# Patient Record
Sex: FEMALE | Race: BLACK OR AFRICAN AMERICAN | Employment: OTHER | URBAN - METROPOLITAN AREA
[De-identification: names, ages, dates, MRNs, and addresses within clinical notes are randomized per-mention and may not be internally consistent; named-entity substitution may affect disease eponyms.]

---

## 2024-07-16 ENCOUNTER — OFFICE VISIT (OUTPATIENT)
Dept: FAMILY MEDICINE CLINIC | Facility: MEDICAL CENTER | Age: 64
End: 2024-07-16
Payer: COMMERCIAL

## 2024-07-16 VITALS
TEMPERATURE: 97.4 F | HEART RATE: 70 BPM | SYSTOLIC BLOOD PRESSURE: 110 MMHG | HEIGHT: 64 IN | OXYGEN SATURATION: 99 % | DIASTOLIC BLOOD PRESSURE: 70 MMHG | RESPIRATION RATE: 18 BRPM | BODY MASS INDEX: 22.53 KG/M2 | WEIGHT: 132 LBS

## 2024-07-16 DIAGNOSIS — I10 ESSENTIAL HYPERTENSION: ICD-10-CM

## 2024-07-16 DIAGNOSIS — Z11.59 NEED FOR HEPATITIS C SCREENING TEST: ICD-10-CM

## 2024-07-16 DIAGNOSIS — E11.9 TYPE 2 DIABETES MELLITUS WITHOUT COMPLICATION, WITHOUT LONG-TERM CURRENT USE OF INSULIN (HCC): ICD-10-CM

## 2024-07-16 DIAGNOSIS — Z11.4 SCREENING FOR HIV (HUMAN IMMUNODEFICIENCY VIRUS): ICD-10-CM

## 2024-07-16 DIAGNOSIS — G89.29 CHRONIC BILATERAL LOW BACK PAIN WITH RIGHT-SIDED SCIATICA: ICD-10-CM

## 2024-07-16 DIAGNOSIS — I48.0 PAROXYSMAL ATRIAL FIBRILLATION (HCC): ICD-10-CM

## 2024-07-16 DIAGNOSIS — E03.9 ACQUIRED HYPOTHYROIDISM: ICD-10-CM

## 2024-07-16 DIAGNOSIS — G56.01 CARPAL TUNNEL SYNDROME OF RIGHT WRIST: ICD-10-CM

## 2024-07-16 DIAGNOSIS — E78.2 MIXED HYPERLIPIDEMIA: ICD-10-CM

## 2024-07-16 DIAGNOSIS — Z12.31 ENCOUNTER FOR SCREENING MAMMOGRAM FOR BREAST CANCER: ICD-10-CM

## 2024-07-16 DIAGNOSIS — M05.9 RHEUMATOID ARTHRITIS WITH POSITIVE RHEUMATOID FACTOR, INVOLVING UNSPECIFIED SITE (HCC): ICD-10-CM

## 2024-07-16 DIAGNOSIS — M54.41 CHRONIC BILATERAL LOW BACK PAIN WITH RIGHT-SIDED SCIATICA: ICD-10-CM

## 2024-07-16 DIAGNOSIS — R19.4 CHANGE IN BOWEL HABITS: Primary | ICD-10-CM

## 2024-07-16 PROCEDURE — 99204 OFFICE O/P NEW MOD 45 MIN: CPT | Performed by: INTERNAL MEDICINE

## 2024-07-16 RX ORDER — FOLIC ACID 1 MG/1
1 TABLET ORAL DAILY
COMMUNITY
Start: 2024-06-18

## 2024-07-16 RX ORDER — METHOTREXATE 2.5 MG/1
20 TABLET ORAL WEEKLY
Qty: 32 TABLET | Refills: 5 | Status: SHIPPED | OUTPATIENT
Start: 2024-07-16

## 2024-07-16 RX ORDER — LEVOTHYROXINE SODIUM 88 UG/1
88 TABLET ORAL DAILY
COMMUNITY

## 2024-07-16 RX ORDER — LISINOPRIL 20 MG/1
20 TABLET ORAL DAILY
COMMUNITY

## 2024-07-16 RX ORDER — ASPIRIN 81 MG/1
81 TABLET, CHEWABLE ORAL DAILY
COMMUNITY
Start: 2024-06-18

## 2024-07-16 RX ORDER — METHOTREXATE 2.5 MG/1
2.5 TABLET ORAL WEEKLY
COMMUNITY
End: 2024-07-16 | Stop reason: SDUPTHER

## 2024-07-16 RX ORDER — ATORVASTATIN CALCIUM 20 MG/1
20 TABLET, FILM COATED ORAL DAILY
COMMUNITY
Start: 2024-06-18

## 2024-07-16 NOTE — PROGRESS NOTES
INTERNAL MEDICINE OFFICE VISIT  [facility] [place] [address]  Tel: [phone]      NAME: Suly Krause  AGE: 64 y.o.  SEX: female  : 1960   MRN: [id number]    DATE: 2024  TIME: 1:18 PM      Assessment and Plan:  1. Change in bowel habits  Was advised to follow-up with GI as she has recently realized that there is a change in bowel habits with alternating constipation and diarrhea.  - Ambulatory Referral to Gastroenterology; Future    2. Carpal tunnel syndrome of right wrist  Will get back to her with the results of the EMG and she was advised to follow-up with the hand surgeon.  She has had carpal tunnel surgery in the past.    - EMG 1 Limb; Future  - Ambulatory Referral to Orthopedic Surgery; Future    3. Chronic bilateral low back pain with right-sided sciatica  Was told to continue doing back exercises    4. Type 2 diabetes mellitus without complication, without long-term current use of insulin ([facility])  Is diet controlled and her last A1c was 5.9 without medication.  Continue on low carbohydrate diet  - Albumin / creatinine urine ratio; Future  - Comprehensive metabolic panel; Future  - Hemoglobin A1C; Future  - CBC and differential; Future  - Lipid Panel with Direct LDL reflex; Future  - TSH, 3rd generation with Free T4 reflex; Future    5. Essential hypertension  Blood pressure is controlled, continue the same medication    6. Mixed hyperlipidemia  Continue atorvastatin    7. Acquired hypothyroidism  Continue levothyroxine 88 mcg daily, TSH was low and her medication was decreased from 100 mcg recently    8. Rheumatoid arthritis with positive rheumatoid factor, involving unspecified site ([facility])  Was told to follow-up with rheumatology  - Ambulatory Referral to Rheumatology; Future  - methotrexate 2.5 MG tablet; Take 8 tablets (20 mg total) by mouth once a week Take 4 tablets in the morning and 4 tablets in the evening.  Dispense: 32  tablet; Refill: 5    9. Paroxysmal atrial fibrillation (HCC)  Has atrial fibrillation but is not on medication.  Was told to follow-up with cardiology  - Ambulatory Referral to Cardiology; Future    10. Encounter for screening mammogram for breast cancer    - Mammo screening bilateral w 3d & cad; Future    11. Screening for HIV (human immunodeficiency virus)    - HIV 1/2 AG/AB w Reflex [facility] for 2 yr old and above; Future    12. Need for hepatitis C screening test    - Hepatitis C Antibody; Future      - Counseling Documentation: patient was counseled regarding: diagnostic results, instructions for management, risk factor reductions, prognosis, patient and family education, risks and benefits of treatment options, and importance of compliance with treatment  - Medication Side Effects: Adverse side effects of medications were reviewed with the patient/guardian today.      Return for follow up visit in 6 months or earlier, if needed.      Chief Complaint:  Chief Complaint   Patient presents with    Physical Exam     Annual physical and est care         History of Present Illness:   This is a new patient who has moved from [place] and is here to establish.  She has multiple medical problems including diet-controlled type 2 diabetes, hypertension, hyperlipidemia, hypothyroidism and rheumatoid arthritis.  She has been taking medication regularly and is controlled.  Her last blood work was in March 2024 which I reviewed with her.  She needs referrals for rheumatology and cardiology as well as for GI and hand surgery.  She says she was diagnosed with atrial fibrillation but I do not see any medication she is on or any documentation of it.          Active Problem List:  Patient Active Problem List   Diagnosis    Change in bowel habits    Carpal tunnel syndrome of right wrist    Type 2 diabetes mellitus without complication, without long-term current use of insulin (HCC)    Chronic bilateral low back pain with  right-sided sciatica    Essential hypertension    Mixed hyperlipidemia    Acquired hypothyroidism    Rheumatoid arthritis with positive rheumatoid factor (HCC)    Paroxysmal atrial fibrillation (HCC)         Past Medical History:  Past Medical History:   Diagnosis Date    Diverticulitis of colon     High cholesterol     Hypertension          Past Surgical History:  Past Surgical History:   Procedure Laterality Date    CARPAL TUNNEL RELEASE       SECTION      CHOLECYSTECTOMY           Family History:  Family History   Problem Relation Age of Onset    COPD Mother     Heart disease Father     Heart attack Father     Crohn's disease Sister     Colon cancer Maternal Grandmother     Alzheimer's disease Paternal Grandmother     Heart disease Paternal Grandfather          Social History:  Social History     Socioeconomic History    Marital status:      Spouse name: None    Number of children: None    Years of education: None    Highest education level: None   Occupational History    None   Tobacco Use    Smoking status: Every Day     Current packs/day: 0.25     Average packs/day: 0.3 packs/day for 46.1 years (11.5 ttl pk-yrs)     Types: Cigarettes     Start date: 1978    Smokeless tobacco: Never   Vaping Use    Vaping status: Never Used   Substance and Sexual Activity    Alcohol use: Yes     Alcohol/week: 1.0 standard drink of alcohol     Types: 1 Glasses of wine per week     Comment: 1 a day    Drug use: Never    Sexual activity: None   Other Topics Concern    None   Social History Narrative    None     Social Determinants of Health     Financial Resource Strain: High Risk (2024)    Received from [facility]    Financial Resource Strain     Do you have any trouble paying for your medications, or do you think you might in the future?: Yes     Does your family have trouble paying for medicine? (Household - for ages 0-17 years): Not on file   Food Insecurity: Patient Declined (2024)    Received from  [facility]    Hunger Vital Sign     Worried About Running Out of Food in the Last Year: Patient declined     Ran Out of Food in the Last Year: Patient declined   Transportation Needs: Unmet Transportation Needs (2/22/2024)    Received from [facility]    Transportation Needs     READ ONLY Do you have trouble getting a ride to medical visits or work?: Sometimes True     Does your family have a hard time getting a ride to doctors’ visits? (Household - for ages 0-17 years): Not on file     Has lack of transportation kept you from medical appointments, meetings, work, or from getting things needed for daily living? Check all that apply. (Adult - for ages 18 years and over): Not on file     Do you (or your family) have trouble finding or paying for a ride (transportation)? (Household - for ages 0-17 years): Not on file   Physical Activity: Not on File (8/21/2019)    Received from [email or web address]    Physical Activity     Physical Activity: 0   Stress: Not on File (8/21/2019)    Received from [email or web address]    Stress     Stress: 0   Social Connections: Socially Integrated (2/22/2024)    Received from [facility]    Social Connections     How often do you feel lonely or isolated from those around you?: Sometimes   Intimate Partner Violence: Not on file   Housing Stability: High Risk (2/22/2024)    Received from [facility]    Housing Stability     Do you currently live in a shelter or have no steady place to sleep at night?: No     READ ONLY Do you think you are at risk of becoming homeless?: Yes     Does your family worry about paying for your home or becoming homeless? (Household - for ages 0-17 years): Not on file     Are you homeless or worried that you might be in the future? (Adult - for ages 18 years and over): Not on file     Are you (or your family) homeless or worried that you might be in the future? (Household - for ages 0-17 years): Not on file         Allergies:  Allergies   Allergen Reactions    Latex Rash    Tomato (Diagnostic) - Food  Allergy Blisters         Medications:    Current Outpatient Medications:     aspirin 81 mg chewable tablet, Chew 81 mg daily, Disp: , Rfl:     atorvastatin (LIPITOR) 20 mg tablet, Take 20 mg by mouth daily, Disp: , Rfl:     folic acid (FOLVITE) 1 mg tablet, Take 1 mg by mouth daily, Disp: , Rfl:     levothyroxine 88 mcg tablet, Take 88 mcg by mouth daily, Disp: , Rfl:     lisinopril (ZESTRIL) 20 mg tablet, Take 20 mg by mouth daily, Disp: , Rfl:     methotrexate 2.5 MG tablet, Take 8 tablets (20 mg total) by mouth once a week Take 4 tablets in the morning and 4 tablets in the evening., Disp: 32 tablet, Rfl: 5      The following portions of the patient's history were reviewed and updated as appropriate: past medical history, past surgical history, family history, social history, allergies, current medications and active problem list.      Review of Systems:  Constitutional: Denies fever, chills, weight gain, weight loss, fatigue  Eyes: Denies eye redness, eye discharge, double vision, change in visual acuity  ENT: Denies hearing loss, tinnitus, sneezing, nasal congestion, nasal discharge, sore throat   Respiratory: Denies cough, expectoration, hemoptysis, shortness of breath, wheezing  Cardiovascular: Denies chest pain, palpitations, lower extremity swelling, orthopnea, PND  Gastrointestinal: Denies abdominal pain, heartburn, nausea, vomiting, hematemesis, has alternating constipation and diarrhea  Genito-Urinary: Denies dysuria, frequency, difficulty in micturition, nocturia, incontinence  Musculoskeletal: Has back pain, joint pain, muscle pain.  Neurologic: Denies confusion, lightheadedness, syncope, headache, focal weakness, has numbness in the right thumb  Endocrine: Denies polyuria, polydipsia, temperature intolerance  Allergy and Immunology: Denies hives, insect bite sensitivity  Hematological and Lymphatic: Denies bleeding problems, swollen glands   Psychological: Denies depression, suicidal ideation, anxiety,  "panic, mood swings  Dermatological: Denies pruritus, rash, skin lesion changes      Vitals:  Vitals:    07/16/24 1250   BP: 110/70   Pulse: 70   Resp: 18   Temp: (!) 97.4 °F (36.3 °C)   SpO2: 99%       Body mass index is 23.02 kg/m².    Weight (last 2 days)       Date/Time Weight    07/16/24 1250 59.9 (132)              Physical Examination:  General: Patient is not in acute distress. Awake, alert, responding to commands. No weight gain or loss  Head: Normocephalic. Atraumatic  Eyes: Conjunctiva and lids with no swelling, erythema or discharge. Both pupils normal sized, round and reactive to light. Sclera nonicteric  ENT: External examination of nose and ear normal. Otoscopic examination shows translucent tympanic membranes with patent canals without erythema. Oropharynx moist with no erythema, edema, exudate or lesions  Neck: Supple. JVP not raised. Trachea midline. No masses. No thyromegaly  Lungs: No signs of increased work of breathing or respiratory distress. Bilateral bronchovascular breath sounds with no crackles or rhonchi  Chest wall: No tenderness  Cardiovascular: Normal PMI. No thrills. Regular rate and rhythm. S1 and S2 normal. No murmur, rub or gallop  Gastrointestinal: Abdomen soft, nontender. No guarding or rigidity. Liver and spleen not palpable. Bowel sounds present  Neurologic: Cranial nerves II-XII intact. Cortical functions normal. Motor system - Reflexes 2+ and symmetrical. Sensations normal  Musculoskeletal: Gait normal. No joint tenderness  Integumentary: Skin normal with no rash or lesions  Lymphatic: No palpable lymph nodes in neck, axilla or groin  Extremities: No clubbing, cyanosis, edema or varicosities  Psychological: Judgement and insight normal. Mood and affect normal      Laboratory Results:  CBC with diff:   No results found for: \"WBC\", \"RBC\", \"HGB\", \"HCT\", \"MCV\", \"MCH\", \"RDW\", \"PLT\"    CMP:  Lab Results   Component Value Date    CREATININE 0.7 03/07/2024    BUN 11 03/07/2024    K " "3.9 03/07/2024     03/07/2024    CO2 27 03/07/2024    ALKPHOS 108 03/07/2024    ALT 22 03/07/2024    AST 25 03/07/2024       Lab Results   Component Value Date    HGBA1C 5.9 (H) 03/07/2024       No results found for: \"TROPONINI\", \"CKMB\", \"CKTOTAL\"    Lipid Profile:   No results found for: \"CHOL\"  No results found for: \"HDL\"  No results found for: \"LDLCALC\"  No results found for: \"TRIG\"    Imaging Results:  No image results found.       Health Maintenance:  Health Maintenance   Topic Date Due    Hepatitis C Screening  Never done    Kidney Health Evaluation: Albumin/Creatinine Ratio  Never done    Diabetic Foot Exam  Never done    DM Eye Exam  Never done    Depression Follow-up Plan  Never done    HIV Screening  Never done    Annual Physical  Never done    Zoster Vaccine (1 of 2) Never done    Cervical Cancer Screening  Never done    Colorectal Cancer Screening  Never done    Pneumococcal Vaccine: Pediatrics (0 to 5 Years) and At-Risk Patients (6 to 64 Years) (2 of 2 - PCV) 04/24/2018    RSV Vaccine Age 60+ Years (1 - 1-dose 60+ series) Never done    COVID-19 Vaccine (4 - 2023-24 season) 09/01/2023    Breast Cancer Screening: Mammogram  08/09/2024    Influenza Vaccine (1) 09/01/2024    HEMOGLOBIN A1C  09/07/2024    Kidney Health Evaluation: GFR  03/07/2025    Depression Screening  07/16/2025    DTaP,Tdap,and Td Vaccines (2 - Td or Tdap) 05/30/2028    RSV Vaccine age 0-20 Months  Aged Out    HIB Vaccine  Aged Out    IPV Vaccine  Aged Out    Hepatitis A Vaccine  Aged Out    Meningococcal ACWY Vaccine  Aged Out    HPV Vaccine  Aged Out     Immunization History   Administered Date(s) Administered    COVID-19 PFIZER VACCINE 0.3 ML IM 05/15/2021, 06/04/2021    COVID-19 Pfizer Vac BIVALENT Edinson-sucrose 12 Yr+ IM 10/21/2022    INFLUENZA 12/18/2017, 10/04/2018, 10/07/2019, 10/15/2020, 10/21/2022    Influenza Injectable, MDCK, Preservative Free, Quadrivalent 09/30/2021    Influenza, Seasonal Vaccine, Quadrivalent, " Adjuvanted, .5e 10/15/2020    Influenza, seasonal, injectable 11/17/2014, 11/12/2015, 01/25/2017    Pneumococcal Polysaccharide PPV23 04/24/2017    Td (adult), adsorbed 03/30/2011    Tdap 05/30/2018    Tuberculin Skin Test-PPD Intradermal 05/02/2016, 05/02/2017, 05/30/2018, 05/31/2019, 12/16/2020, 06/27/2022         Pako Owens MD  7/16/2024,1:18 PM

## 2024-07-17 NOTE — PROGRESS NOTES
Diabetic Foot Exam    Patient's shoes and socks removed.    Right Foot/Ankle   Right Foot Inspection  Skin Exam: skin normal. Skin not intact, no dry skin, no warmth, no callus, no erythema, no maceration, no abnormal color, no pre-ulcer, no ulcer and no callus.     Toe Exam: ROM and strength within normal limits. No swelling, no tenderness, erythema and  no right toe deformity    Sensory   Monofilament testing: intact    Vascular  Capillary refills: < 3 seconds  The right DP pulse is 1+. The right PT pulse is 1+.     Left Foot/Ankle  Left Foot Inspection  Skin Exam: skin normal. Skin not intact, no dry skin, no warmth, no erythema, no maceration, normal color, no pre-ulcer, no ulcer and no callus.     Toe Exam: ROM and strength within normal limits. No swelling, no tenderness, no erythema and no left toe deformity.     Sensory   Monofilament testing: intact    Vascular  Capillary refills: < 3 seconds  The left DP pulse is 1+. The left PT pulse is 1+.     Assign Risk Category  No deformity present  No loss of protective sensation  No weak pulses  Risk: 0

## 2024-07-23 ENCOUNTER — CLINICAL SUPPORT (OUTPATIENT)
Dept: FAMILY MEDICINE CLINIC | Facility: MEDICAL CENTER | Age: 64
End: 2024-07-23
Payer: COMMERCIAL

## 2024-07-23 DIAGNOSIS — Z12.31 ENCOUNTER FOR SCREENING MAMMOGRAM FOR BREAST CANCER: ICD-10-CM

## 2024-07-23 DIAGNOSIS — E11.9 TYPE 2 DIABETES MELLITUS WITHOUT COMPLICATION, WITHOUT LONG-TERM CURRENT USE OF INSULIN (HCC): ICD-10-CM

## 2024-07-23 LAB
CREAT UR-MCNC: 51.6 MG/DL
LEFT EYE DIABETIC RETINOPATHY: NORMAL
LEFT EYE IMAGE QUALITY: NORMAL
LEFT EYE MACULAR EDEMA: NORMAL
LEFT EYE OTHER RETINOPATHY: NORMAL
MICROALBUMIN UR-MCNC: 11.1 MG/L
MICROALBUMIN/CREAT 24H UR: 22 MG/G CREATININE (ref 0–30)
RIGHT EYE DIABETIC RETINOPATHY: NORMAL
RIGHT EYE IMAGE QUALITY: NORMAL
RIGHT EYE MACULAR EDEMA: NORMAL
RIGHT EYE OTHER RETINOPATHY: NORMAL
SEVERITY (EYE EXAM): NORMAL
SL AMB POCT HEMOGLOBIN AIC: 5.7 (ref ?–6.5)

## 2024-07-23 PROCEDURE — 92250 FUNDUS PHOTOGRAPHY W/I&R: CPT

## 2024-07-23 PROCEDURE — 82043 UR ALBUMIN QUANTITATIVE: CPT | Performed by: INTERNAL MEDICINE

## 2024-07-23 PROCEDURE — 83036 HEMOGLOBIN GLYCOSYLATED A1C: CPT

## 2024-07-23 PROCEDURE — 82570 ASSAY OF URINE CREATININE: CPT | Performed by: INTERNAL MEDICINE

## 2024-07-23 NOTE — PROGRESS NOTES
Ambulatory Visit  Name: Suly Krause      : 1960      MRN: [id number]  Encounter Provider: Janey Love  Encounter Date: 2024   Encounter department: [facility]    Assessment & Plan   1. Type 2 diabetes mellitus without complication, without long-term current use of insulin (HCC)  -     IRIS Diabetic eye exam  -     POCT hemoglobin A1c  -     Albumin / creatinine urine ratio  2. Encounter for screening mammogram for breast cancer       History of Present Illness         Objective     There were no vitals taken for this visit.      Administrative Statements

## 2024-07-27 ENCOUNTER — APPOINTMENT (OUTPATIENT)
Dept: LAB | Facility: MEDICAL CENTER | Age: 64
End: 2024-07-27
Payer: COMMERCIAL

## 2024-07-27 DIAGNOSIS — Z11.4 SCREENING FOR HIV (HUMAN IMMUNODEFICIENCY VIRUS): ICD-10-CM

## 2024-07-27 DIAGNOSIS — E11.9 TYPE 2 DIABETES MELLITUS WITHOUT COMPLICATION, WITHOUT LONG-TERM CURRENT USE OF INSULIN (HCC): ICD-10-CM

## 2024-07-27 DIAGNOSIS — Z11.59 NEED FOR HEPATITIS C SCREENING TEST: ICD-10-CM

## 2024-07-27 LAB
ALBUMIN SERPL BCG-MCNC: 4.3 G/DL (ref 3.5–5)
ALP SERPL-CCNC: 94 U/L (ref 34–104)
ALT SERPL W P-5'-P-CCNC: 19 U/L (ref 7–52)
ANION GAP SERPL CALCULATED.3IONS-SCNC: 6 MMOL/L (ref 4–13)
AST SERPL W P-5'-P-CCNC: 26 U/L (ref 13–39)
BASOPHILS # BLD AUTO: 0.02 THOUSANDS/ÂΜL (ref 0–0.1)
BASOPHILS NFR BLD AUTO: 0 % (ref 0–1)
BILIRUB SERPL-MCNC: 0.42 MG/DL (ref 0.2–1)
BUN SERPL-MCNC: 14 MG/DL (ref 5–25)
CALCIUM SERPL-MCNC: 9.9 MG/DL (ref 8.4–10.2)
CHLORIDE SERPL-SCNC: 105 MMOL/L (ref 96–108)
CHOLEST SERPL-MCNC: 124 MG/DL
CO2 SERPL-SCNC: 31 MMOL/L (ref 21–32)
CREAT SERPL-MCNC: 0.75 MG/DL (ref 0.6–1.3)
CREAT UR-MCNC: 115.5 MG/DL
EOSINOPHIL # BLD AUTO: 0.11 THOUSAND/ÂΜL (ref 0–0.61)
EOSINOPHIL NFR BLD AUTO: 2 % (ref 0–6)
ERYTHROCYTE [DISTWIDTH] IN BLOOD BY AUTOMATED COUNT: 14.5 % (ref 11.6–15.1)
EST. AVERAGE GLUCOSE BLD GHB EST-MCNC: 131 MG/DL
GFR SERPL CREATININE-BSD FRML MDRD: 84 ML/MIN/1.73SQ M
GLUCOSE P FAST SERPL-MCNC: 76 MG/DL (ref 65–99)
HBA1C MFR BLD: 6.2 %
HCT VFR BLD AUTO: 38.1 % (ref 34.8–46.1)
HDLC SERPL-MCNC: 53 MG/DL
HGB BLD-MCNC: 11.9 G/DL (ref 11.5–15.4)
IMM GRANULOCYTES # BLD AUTO: 0.03 THOUSAND/UL (ref 0–0.2)
IMM GRANULOCYTES NFR BLD AUTO: 0 % (ref 0–2)
LDLC SERPL CALC-MCNC: 53 MG/DL (ref 0–100)
LYMPHOCYTES # BLD AUTO: 2.26 THOUSANDS/ÂΜL (ref 0.6–4.47)
LYMPHOCYTES NFR BLD AUTO: 32 % (ref 14–44)
MCH RBC QN AUTO: 28.8 PG (ref 26.8–34.3)
MCHC RBC AUTO-ENTMCNC: 31.2 G/DL (ref 31.4–37.4)
MCV RBC AUTO: 92 FL (ref 82–98)
MICROALBUMIN UR-MCNC: 43.2 MG/L
MICROALBUMIN/CREAT 24H UR: 37 MG/G CREATININE (ref 0–30)
MONOCYTES # BLD AUTO: 0.54 THOUSAND/ÂΜL (ref 0.17–1.22)
MONOCYTES NFR BLD AUTO: 8 % (ref 4–12)
NEUTROPHILS # BLD AUTO: 4.01 THOUSANDS/ÂΜL (ref 1.85–7.62)
NEUTS SEG NFR BLD AUTO: 58 % (ref 43–75)
NRBC BLD AUTO-RTO: 0 /100 WBCS
PLATELET # BLD AUTO: 238 THOUSANDS/UL (ref 149–390)
PMV BLD AUTO: 10.4 FL (ref 8.9–12.7)
POTASSIUM SERPL-SCNC: 3.7 MMOL/L (ref 3.5–5.3)
PROT SERPL-MCNC: 7 G/DL (ref 6.4–8.4)
RBC # BLD AUTO: 4.13 MILLION/UL (ref 3.81–5.12)
SODIUM SERPL-SCNC: 142 MMOL/L (ref 135–147)
TRIGL SERPL-MCNC: 89 MG/DL
TSH SERPL DL<=0.05 MIU/L-ACNC: 2.05 UIU/ML (ref 0.45–4.5)
WBC # BLD AUTO: 6.97 THOUSAND/UL (ref 4.31–10.16)

## 2024-07-27 PROCEDURE — 3044F HG A1C LEVEL LT 7.0%: CPT | Performed by: INTERNAL MEDICINE

## 2024-07-27 PROCEDURE — 82043 UR ALBUMIN QUANTITATIVE: CPT

## 2024-07-27 PROCEDURE — 85025 COMPLETE CBC W/AUTO DIFF WBC: CPT

## 2024-07-27 PROCEDURE — 80061 LIPID PANEL: CPT

## 2024-07-27 PROCEDURE — 36415 COLL VENOUS BLD VENIPUNCTURE: CPT

## 2024-07-27 PROCEDURE — 86803 HEPATITIS C AB TEST: CPT

## 2024-07-27 PROCEDURE — 83036 HEMOGLOBIN GLYCOSYLATED A1C: CPT

## 2024-07-27 PROCEDURE — 80053 COMPREHEN METABOLIC PANEL: CPT

## 2024-07-27 PROCEDURE — 84443 ASSAY THYROID STIM HORMONE: CPT

## 2024-07-27 PROCEDURE — 82570 ASSAY OF URINE CREATININE: CPT

## 2024-07-27 PROCEDURE — 87389 HIV-1 AG W/HIV-1&-2 AB AG IA: CPT

## 2024-07-28 LAB
HCV AB SER QL: NORMAL
HIV 1+2 AB+HIV1 P24 AG SERPL QL IA: NORMAL
HIV 2 AB SERPL QL IA: NORMAL
HIV1 AB SERPL QL IA: NORMAL
HIV1 P24 AG SERPL QL IA: NORMAL

## 2024-08-23 ENCOUNTER — HOSPITAL ENCOUNTER (OUTPATIENT)
Dept: RADIOLOGY | Facility: HOSPITAL | Age: 64
Discharge: HOME | End: 2024-08-23
Payer: COMMERCIAL

## 2024-08-23 ENCOUNTER — OFFICE VISIT (OUTPATIENT)
Dept: GASTROENTEROLOGY | Facility: AMBULARY SURGERY CENTER | Age: 64
End: 2024-08-23
Payer: COMMERCIAL

## 2024-08-23 VITALS
HEIGHT: 64 IN | OXYGEN SATURATION: 99 % | WEIGHT: 132 LBS | SYSTOLIC BLOOD PRESSURE: 124 MMHG | BODY MASS INDEX: 22.53 KG/M2 | DIASTOLIC BLOOD PRESSURE: 68 MMHG | HEART RATE: 69 BPM

## 2024-08-23 DIAGNOSIS — R19.4 CHANGE IN BOWEL HABITS: ICD-10-CM

## 2024-08-23 DIAGNOSIS — K59.00 CONSTIPATION, UNSPECIFIED CONSTIPATION TYPE: Primary | ICD-10-CM

## 2024-08-23 DIAGNOSIS — K59.00 CONSTIPATION, UNSPECIFIED CONSTIPATION TYPE: ICD-10-CM

## 2024-08-23 PROCEDURE — 74018 RADEX ABDOMEN 1 VIEW: CPT

## 2024-08-23 PROCEDURE — 99203 OFFICE O/P NEW LOW 30 MIN: CPT | Performed by: PHYSICIAN ASSISTANT

## 2024-08-23 RX ORDER — NAPROXEN SODIUM 220 MG
220 TABLET ORAL EVERY 12 HOURS PRN
COMMUNITY

## 2024-08-23 RX ORDER — POLYETHYLENE GLYCOL 3350 17 G/17G
17 POWDER, FOR SOLUTION ORAL 2 TIMES DAILY
Qty: 238 G | Refills: 2 | Status: SHIPPED | OUTPATIENT
Start: 2024-08-23

## 2024-08-23 NOTE — PATIENT INSTRUCTIONS
Scheduled date of colonoscopy (as of today): Oct 14, 2024   Physician performing colonoscopy: Dr. Leiva  Location of colonoscopy: [facility]   Bowel prep reviewed with patient: [email or web address]  Instructions reviewed with patient by: ERVIN   Clearances: n/a     I recommend 238g of Miralax bottle mixed with 64 oz of gatorade/sports drink and drink over 2-3 hours.     The following day, start Miralax 1 capful twice a day with dulcolax as needed.

## 2024-08-23 NOTE — PROGRESS NOTES
[facility] - Outpatient Consultation  Suly Krause 64 y.o. female MRN: [id number]  Encounter: [id number]          ASSESSMENT AND PLAN:      Pleasant 64-year-old female with history of A-fib not on anticoagulation, type 2 diabetes, rheumatoid arthritis on methotrexate who presents to the office as a new patient for change in bowel habits.    Change in bowel habits, constipation  Reports abrupt new constipation over the past 3 months, going several days without bowel movements despite MiraLAX 1 capful daily.  Most recent bowel movement yesterday.  Abdomen is benign. No blood.  Recent lab work with normal hemoglobin and TSH.  Possibility chronic idiopathic constipation however rule out underlying polyp or lesion.    -Recommend colonoscopy due to change in bowel habits.  - GoLytely bowel prep.  -I obtained informed consent from the patient. The risks/benefits/alternatives of the procedure were discussed with the patient. Risks included, but not limited to, infection, bleeding, perforation, injury to organs in the abdomen, missed lesion and incomplete procedure were discussed. Patient was agreeable.    -Obtain KUB to assess severity of stool burden.  - If significant stool burden, recommend MiraLAX prep now.  Instructions given on after visit summary.  - After completing this, increase MiraLAX to 1 capful twice daily.    -If colonoscopy is unremarkable above, likely would benefit from Linzess or Amitiza.    -Advised patient to reach out in the future if she does not have a bowel movement for several days.    -Consider abdominal imaging with CT scan pending colonoscopy results and response to increased MiraLAX dose      Patient was recommended to follow up after procedure. Patient was recommended to reach out via [facility] with any questions or concerns in the meantime.   ______________________________________________________________________    HPI:      Suly Krause is a 64 y.o. female with A-fib  not on anticoagulation, hypertension, type 2 diabetes, rheumatoid arthritis on methotrexate who presents the office as a new patient for change in bowel habits.    Patient reports relatively abrupt onset of severe constipation over the past 3 months.  She reports starting around May she started having severe constipation.  She had 4 days without a bowel movement, passage of very small stools.  She then started MiraLAX 1 capful daily as well as Dulcolax as needed.  Despite this she had an episode where she did not have a bowel movement for 11 days which caused significant bloating, back pain and excessive gas.  She has been maintained on MiraLAX daily and Dulcolax.  She denies any upper GI symptoms.    She reports her weight has been stable.  No blood in the stool.  She denies any change in medications or dietary changes.    She has family history of colon cancer in her grandmother.  Her sister has Crohn's disease.    Most recent lab work showing normal hemoglobin and liver enzymes.  TSH normal.    Last colonoscopy she reports was in [place] in 2020.  She reports she had polyps removed.  She reports getting colonoscopies every 3 to 4 years.  She reports a prior EGD around 15 years ago in [place].    REVIEW OF SYSTEMS:    CONSTITUTIONAL: Denies any fever, chills, rigors, and weight loss.  HEENT: No earache or tinnitus. Denies hearing loss or visual disturbances.  CARDIOVASCULAR: No chest pain or palpitations.   RESPIRATORY: Denies any cough, hemoptysis, shortness of breath or dyspnea on exertion.  GASTROINTESTINAL: As noted in the History of Present Illness.   GENITOURINARY: No problems with urination. Denies any hematuria or dysuria.  NEUROLOGIC: No dizziness or vertigo, denies headaches.   MUSCULOSKELETAL: Denies any muscle or joint pain.   SKIN: Denies skin rashes or itching.   ENDOCRINE: Denies excessive thirst. Denies intolerance to heat or cold.  PSYCHOSOCIAL: Denies depression or anxiety. Denies any  recent memory loss.       Historical Information   Past Medical History:   Diagnosis Date    Diverticulitis of colon     High cholesterol     Hypertension      Past Surgical History:   Procedure Laterality Date    CARPAL TUNNEL RELEASE       SECTION      CHOLECYSTECTOMY       Social History   Social History     Substance and Sexual Activity   Alcohol Use Yes    Alcohol/week: 1.0 standard drink of alcohol    Types: 1 Glasses of wine per week    Comment: 1 a day     Social History     Substance and Sexual Activity   Drug Use Never     Social History     Tobacco Use   Smoking Status Every Day    Current packs/day: 0.25    Average packs/day: 0.3 packs/day for 46.2 years (11.6 ttl pk-yrs)    Types: Cigarettes    Start date: 1978   Smokeless Tobacco Never     Family History   Problem Relation Age of Onset    COPD Mother     Heart disease Father     Heart attack Father     Crohn's disease Sister     Colon cancer Maternal Grandmother     Alzheimer's disease Paternal Grandmother     Heart disease Paternal Grandfather        Meds/Allergies       Current Outpatient Medications:     aspirin 81 mg chewable tablet    atorvastatin (LIPITOR) 20 mg tablet    folic acid (FOLVITE) 1 mg tablet    levothyroxine 88 mcg tablet    lisinopril (ZESTRIL) 20 mg tablet    methotrexate 2.5 MG tablet    Allergies   Allergen Reactions    Latex Rash    Tomato (Diagnostic) - Food Allergy Blisters           Objective     There were no vitals taken for this visit. There is no height or weight on file to calculate BMI.        PHYSICAL EXAM:      General Appearance:   Alert, cooperative, no distress   HEENT:   Normocephalic, atraumatic, anicteric.     Neck:  Supple, symmetrical, trachea midline   Lungs:   Clear to auscultation bilaterally; no rales, rhonchi or wheezing; respirations unlabored    Heart::   Regular rate and rhythm; no murmur, rub, or gallop.   Abdomen:   Soft, non-tender, non-distended; normal bowel sounds; no masses, no  organomegaly. Benign abdomen.    Genitalia:   Deferred    Rectal:   Deferred    Extremities:  No cyanosis, clubbing or edema    Pulses:  2+ and symmetric    Skin:  No jaundice, rashes, or lesions    Lymph nodes:  No palpable cervical lymphadenopathy        Lab Results:   No visits with results within 1 Day(s) from this visit.   Latest known visit with results is:   Appointment on 07/27/2024   Component Date Value    Hepatitis C Ab 07/27/2024 Non-reactive     HIV-1 p24 Antigen 07/27/2024 Non-Reactive     HIV-1 Antibody 07/27/2024 Non-Reactive     HIV-2 Antibody 07/27/2024 Non-Reactive     HIV Ag-Ab 5th Gen 07/27/2024 Non-Reactive     Creatinine, Ur 07/27/2024 115.5     Albumin,U,Random 07/27/2024 43.2 (H)     Albumin Creat Ratio 07/27/2024 37 (H)     Sodium 07/27/2024 142     Potassium 07/27/2024 3.7     Chloride 07/27/2024 105     CO2 07/27/2024 31     ANION GAP 07/27/2024 6     BUN 07/27/2024 14     Creatinine 07/27/2024 0.75     Glucose, Fasting 07/27/2024 76     Calcium 07/27/2024 9.9     AST 07/27/2024 26     ALT 07/27/2024 19     Alkaline Phosphatase 07/27/2024 94     Total Protein 07/27/2024 7.0     Albumin 07/27/2024 4.3     Total Bilirubin 07/27/2024 0.42     eGFR 07/27/2024 84     Hemoglobin A1C 07/27/2024 6.2 (H)     EAG 07/27/2024 131     WBC 07/27/2024 6.97     RBC 07/27/2024 4.13     Hemoglobin 07/27/2024 11.9     Hematocrit 07/27/2024 38.1     MCV 07/27/2024 92     MCH 07/27/2024 28.8     MCHC 07/27/2024 31.2 (L)     RDW 07/27/2024 14.5     MPV 07/27/2024 10.4     Platelets 07/27/2024 238     nRBC 07/27/2024 0     Segmented % 07/27/2024 58     Immature Grans % 07/27/2024 0     Lymphocytes % 07/27/2024 32     Monocytes % 07/27/2024 8     Eosinophils Relative 07/27/2024 2     Basophils Relative 07/27/2024 0     Absolute Neutrophils 07/27/2024 4.01     Absolute Immature Grans 07/27/2024 0.03     Absolute Lymphocytes 07/27/2024 2.26     Absolute Monocytes 07/27/2024 0.54     Eosinophils Absolute 07/27/2024  0.11     Basophils Absolute 07/27/2024 0.02     Cholesterol 07/27/2024 124     Triglycerides 07/27/2024 89     HDL, Direct 07/27/2024 53     LDL Calculated 07/27/2024 53     TSH 3RD GENERATON 07/27/2024 2.051          Radiology Results:   No results found.

## 2024-09-16 ENCOUNTER — OFFICE VISIT (OUTPATIENT)
Dept: CARDIOLOGY CLINIC | Facility: MEDICAL CENTER | Age: 64
End: 2024-09-16
Payer: COMMERCIAL

## 2024-09-16 VITALS
SYSTOLIC BLOOD PRESSURE: 118 MMHG | HEIGHT: 64 IN | DIASTOLIC BLOOD PRESSURE: 70 MMHG | HEART RATE: 72 BPM | WEIGHT: 132 LBS | OXYGEN SATURATION: 97 % | BODY MASS INDEX: 22.53 KG/M2

## 2024-09-16 DIAGNOSIS — R00.2 PALPITATIONS: Primary | ICD-10-CM

## 2024-09-16 DIAGNOSIS — M06.9 RHEUMATOID ARTHRITIS, INVOLVING UNSPECIFIED SITE, UNSPECIFIED WHETHER RHEUMATOID FACTOR PRESENT (HCC): ICD-10-CM

## 2024-09-16 DIAGNOSIS — E78.5 HYPERLIPIDEMIA, UNSPECIFIED HYPERLIPIDEMIA TYPE: ICD-10-CM

## 2024-09-16 DIAGNOSIS — Z86.79 HISTORY OF CAROTID ARTERY DISEASE: ICD-10-CM

## 2024-09-16 DIAGNOSIS — F17.200 CURRENT SMOKER: ICD-10-CM

## 2024-09-16 DIAGNOSIS — Z86.73 HISTORY OF MULTIPLE STROKES: ICD-10-CM

## 2024-09-16 DIAGNOSIS — I49.1 PAC (PREMATURE ATRIAL CONTRACTION): ICD-10-CM

## 2024-09-16 PROCEDURE — 99204 OFFICE O/P NEW MOD 45 MIN: CPT | Performed by: INTERNAL MEDICINE

## 2024-09-16 PROCEDURE — 93000 ELECTROCARDIOGRAM COMPLETE: CPT | Performed by: INTERNAL MEDICINE

## 2024-09-16 NOTE — PROGRESS NOTES
[facility] [place]  [address]  [address]  [facility] [id number] [phone]                                              Cardiology Office Consult  Suly Krause, 64 y.o. female  YOB: 1960  MRN: [id number] Encounter: [id number]      PCP - Pako Owens MD  Referring Provider - Pako Owens MD    Chief Complaint   Patient presents with    New Patient Visit     Referred to by PCP after Stroke and A-fib    Palpitations    Shortness of Breath       Assessment  Palpitations  Hypertension  Stroke, multiple  Last stroke - 2007, right sided weakness.   Residual very mild right sided weakness,  stength 4+  Rheumatoid arthritis  Hypothyroidism  Hyperlipidemia  Current smoking  Used to smoke up to 6 cigarettes/day --> 3 cigarettes/day  Everybody smokes at home  Alcohol use - two 6 oz glasses of white wine daily    Plan  Assessment & Plan  Palpitations  Overview  She has on & off short duration palpitations, but states that she was diagnosed with atrial fibrillation at [facility], but no available documentation for same  9/16/24 - initial OV with me  ECG - NSR with PACs  Not on BB or anticoagulation  Impression  Symptoms related to PACs or short runs of AT, but certainly at elevated risk of atrial fibrillation and with her prior multiple strokes and not being on anticoagulation needs further evaluation  Plan  Check 2-week Zio patch monitor  Monitor palpitations and counseled regarding high risk features of same  Check echocardiogram  Limit caffeine and alcohol intake  Emphasized need to quit smoking, she is already working on  PAC (premature atrial contraction)  Occasional PACs noted  Previously reported to have about 3% PACs on Holter monitor at [facility]  Follow-up on Zio    History of multiple strokes  Very limited information available about the strokes, and last 1 was apparently 2007  Unclear etiologies  Assess for A-fib on Zio patch monitor  She reports having carotid  disease in the past and believes that was the cause of it, but never had intervention  Check carotid vascular duplex  Continue aspirin, atorvastatin  She already has referral to neurologist and is awaiting consultation with them --> Follow up with neurologist  Hyperlipidemia, unspecified hyperlipidemia type    Well-controlled  Continue atorvastatin 20 mg daily  History of carotid artery disease  Reports this was the cause of the stroke but she never had any intervention on it  Check carotid duplex  Current smoker    Rheumatoid arthritis, involving unspecified site, unspecified whether rheumatoid factor present (HCC)            No results found for this visit on 09/16/24.    No orders of the defined types were placed in this encounter.    No follow-ups on file.      History of Present Illness   64 y.o. female comes in as a new patient for consultation regarding palpitations / ?atrial fibrillation, after being referred by PCP.    She moved to this area from [place], [place]. She used to see a cardiologist there  for palpitations and shortness of breath. She had a stress echo, which was reported to be normal. She had a holter monitor as well, where she had 3% PACs, but no other significant heart rhythm abnormalities.    She reports having had multiple strokes, while in [place] in early 2000s - last in 2007.  She reports to me that she was apparently diagnosed with atrial fibrillation based on exam done at [facility].  She apparently did not have any EKG done at that time and never was placed on anticoagulation for it.  She does report some on and off symptoms of palpitations which are mostly brief, but she has not had any prolonged episodes or any associated dizziness.  She recently established care with PCP here at [facility] and is now referred to see cardiology for further evaluation.    Historical Information   Past Medical History:   Diagnosis Date    Diverticulitis of colon     High cholesterol     Hypertension       Past Surgical History:   Procedure Laterality Date    CARPAL TUNNEL RELEASE       SECTION      CHOLECYSTECTOMY       Family History   Problem Relation Age of Onset    COPD Mother     Heart disease Father     Heart attack Father     Crohn's disease Sister     Colon cancer Maternal Grandmother     Alzheimer's disease Paternal Grandmother     Heart disease Paternal Grandfather      Current Outpatient Medications   Medication Instructions    aspirin 81 mg, Oral, Daily    atorvastatin (LIPITOR) 20 mg, Oral, Daily    folic acid (FOLVITE) 1 mg, Oral, Daily    levothyroxine 88 mcg, Oral, Daily    lisinopril (ZESTRIL) 20 mg, Oral, Daily    methotrexate 20 mg, Oral, Weekly, Take 4 tablets in the morning and 4 tablets in the evening.    naproxen sodium (ALEVE) 220 mg, Oral, Every 12 hours PRN    polyethylene glycol (GLYCOLAX) 17 g, Oral, 2 times daily    polyethylene glycol (GOLYTELY) 4000 mL solution 4,000 mL, Oral, Once      Allergies   Allergen Reactions    Latex Rash    Tomato (Diagnostic) - Food Allergy Blisters     Social History     Socioeconomic History    Marital status:      Spouse name: None    Number of children: None    Years of education: None    Highest education level: None   Occupational History    None   Tobacco Use    Smoking status: Every Day     Current packs/day: 0.25     Average packs/day: 0.2 packs/day for 46.3 years (11.6 ttl pk-yrs)     Types: Cigarettes     Start date: 1978    Smokeless tobacco: Never   Vaping Use    Vaping status: Never Used   Substance and Sexual Activity    Alcohol use: Yes     Alcohol/week: 1.0 standard drink of alcohol     Types: 1 Glasses of wine per week     Comment: 1 a day    Drug use: Never    Sexual activity: None   Other Topics Concern    None   Social History Narrative    None     Social Determinants of Health     Financial Resource Strain: High Risk (2024)    Received from [employer]    Financial Resource Strain     Do you have any trouble  paying for your medications, or do you think you might in the future?: Yes     Does your family have trouble paying for medicine? (Household - for ages 0-17 years): Not on file   Food Insecurity: Not on file (9/6/2024)   Transportation Needs: Unmet Transportation Needs (2/22/2024)    Received from [employer]    Transportation Needs     READ ONLY Do you have trouble getting a ride to medical visits or work?: Sometimes True     Does your family have a hard time getting a ride to doctors’ visits? (Household - for ages 0-17 years): Not on file     Has lack of transportation kept you from medical appointments, meetings, work, or from getting things needed for daily living? Check all that apply. (Adult - for ages 18 years and over): Not on file     Do you (or your family) have trouble finding or paying for a ride (transportation)? (Household - for ages 0-17 years): Not on file   Physical Activity: Not on File (8/21/2019)    Received from [employer]    Physical Activity     Physical Activity: 0   Stress: Not on File (8/21/2019)    Received from [employer]    Stress     Stress: 0   Social Connections: Socially Integrated (2/22/2024)    Received from [employer]    Social Connections     How often do you feel lonely or isolated from those around you?: Sometimes   Intimate Partner Violence: Not on file   Housing Stability: High Risk (2/22/2024)    Received from [employer]    Housing Stability     Do you currently live in a shelter or have no steady place to sleep at night?: No     READ ONLY Do you think you are at risk of becoming homeless?: Yes     Does your family worry about paying for your home or becoming homeless? (Household - for ages 0-17 years): Not on file     Are you homeless or worried that you might be in the future? (Adult - for ages 18 years and over): Not on file     Are you (or your family) homeless or worried that you might be in the future? (Household - for ages 0-17 years): Not on file        Review of Systems   All  "other systems reviewed and are negative.        Vitals:  Vitals:    09/16/24 1046   BP: 118/70   BP Location: Left arm   Patient Position: Sitting   Cuff Size: Adult   Pulse: 72   SpO2: 97%   Weight: 59.9 kg (132 lb)   Height: 5' 3.5\" (1.613 m)     BMI - Body mass index is 23.02 kg/m².  Wt Readings from Last 7 Encounters:   09/16/24 59.9 kg (132 lb)   08/23/24 59.9 kg (132 lb)   07/16/24 59.9 kg (132 lb)       Physical Exam  Vitals and nursing note reviewed.   Constitutional:       General: She is not in acute distress.     Appearance: Normal appearance. She is well-developed. She is obese. She is not ill-appearing.   HENT:      Head: Normocephalic and atraumatic.      Nose: No congestion.   Eyes:      General: No scleral icterus.     Conjunctiva/sclera: Conjunctivae normal.   Neck:      Vascular: No carotid bruit or JVD.   Cardiovascular:      Rate and Rhythm: Normal rate and regular rhythm.      Pulses: Normal pulses.      Heart sounds: Normal heart sounds. No murmur heard.     No friction rub. No gallop.   Pulmonary:      Effort: Pulmonary effort is normal. No respiratory distress.      Breath sounds: Normal breath sounds. No rales.   Abdominal:      General: There is no distension.      Palpations: Abdomen is soft.      Tenderness: There is no abdominal tenderness.   Musculoskeletal:         General: No swelling or tenderness.      Cervical back: Neck supple.      Right lower leg: No edema.      Left lower leg: No edema.   Skin:     General: Skin is warm.   Neurological:      Mental Status: She is alert and oriented to person, place, and time. Mental status is at baseline.      Comments: Very mild right sided weakness, right hand  strength 4+/5   Psychiatric:         Mood and Affect: Mood normal.         Behavior: Behavior normal.         Thought Content: Thought content normal.           Labs:  CBC:   Lab Results   Component Value Date    WBC 6.97 07/27/2024    RBC 4.13 07/27/2024    HGB 11.9 07/27/2024    " "HCT 38.1 07/27/2024    MCV 92 07/27/2024     07/27/2024    RDW 14.5 07/27/2024       CMP:   Lab Results   Component Value Date    K 3.7 07/27/2024     07/27/2024    CO2 31 07/27/2024    BUN 14 07/27/2024    CREATININE 0.75 07/27/2024    EGFR 84 07/27/2024    CALCIUM 9.9 07/27/2024    AST 26 07/27/2024    ALT 19 07/27/2024    ALKPHOS 94 07/27/2024       Magnesium:  No results found for: \"MG\"    Lipid Profile:   Lab Results   Component Value Date    HDL 53 07/27/2024    TRIG 89 07/27/2024    LDLCALC 53 07/27/2024       Thyroid Studies:   Lab Results   Component Value Date    SPN2UVXHBFSV 2.051 07/27/2024       A1c:  No components found for: \"HGA1C\"    INR:  No results found for: \"INR\"5    Cardiac testing:   No results found for this or any previous visit.    No results found for this or any previous visit.    No results found for this or any previous visit.    No results found for this or any previous visit.      XR abdomen 1 view kub  Narrative: ABDOMEN    INDICATION:   Change in bowel habit. Constipation, unspecified.     COMPARISON:  None.    VIEWS:    FINDINGS:    There is a nonobstructive bowel gas pattern.  There is extensive fecal retention throughout the lower quadrants of the colon.    No discernible free air on this supine study.  Upright or left lateral decubitus imaging is more sensitive to detect subtle free air in the appropriate setting.    No pathologic calcifications or soft tissue masses.     Visualized lung bases are clear.    Visualized osseous structures are unremarkable for the patient's age.  Impression: Significant fecal retention throughout the colon with relative sparing of the rectum only. Findings consistent with constipation.  No small bowel obstruction or dilatation.    Electronically signed: 08/23/2024 02:23 PM Trent Feng MD         "

## 2024-09-16 NOTE — ASSESSMENT & PLAN NOTE
Occasional PACs noted  Previously reported to have about 3% PACs on Holter monitor at [facility]  Follow-up on Zio

## 2024-09-30 ENCOUNTER — ANESTHESIA EVENT (OUTPATIENT)
Dept: ANESTHESIOLOGY | Facility: HOSPITAL | Age: 64
End: 2024-09-30

## 2024-09-30 ENCOUNTER — ANESTHESIA (OUTPATIENT)
Dept: ANESTHESIOLOGY | Facility: HOSPITAL | Age: 64
End: 2024-09-30

## 2024-10-14 ENCOUNTER — HOSPITAL ENCOUNTER (OUTPATIENT)
Dept: GASTROENTEROLOGY | Facility: AMBULARY SURGERY CENTER | Age: 64
Setting detail: OUTPATIENT SURGERY
Discharge: HOME | End: 2024-10-14
Payer: COMMERCIAL

## 2024-10-14 ENCOUNTER — EVENT RECORDER/EXTENDED HOLTER (OUTPATIENT)
Dept: CARDIOLOGY CLINIC | Facility: MEDICAL CENTER | Age: 64
End: 2024-10-14
Payer: COMMERCIAL

## 2024-10-14 ENCOUNTER — ANESTHESIA (OUTPATIENT)
Dept: GASTROENTEROLOGY | Facility: AMBULARY SURGERY CENTER | Age: 64
End: 2024-10-14
Payer: COMMERCIAL

## 2024-10-14 VITALS
RESPIRATION RATE: 17 BRPM | HEART RATE: 63 BPM | TEMPERATURE: 97.5 F | HEIGHT: 63 IN | WEIGHT: 129 LBS | BODY MASS INDEX: 22.86 KG/M2 | DIASTOLIC BLOOD PRESSURE: 70 MMHG | OXYGEN SATURATION: 99 % | SYSTOLIC BLOOD PRESSURE: 130 MMHG

## 2024-10-14 DIAGNOSIS — R00.2 PALPITATIONS: ICD-10-CM

## 2024-10-14 DIAGNOSIS — R19.4 CHANGE IN BOWEL HABITS: ICD-10-CM

## 2024-10-14 DIAGNOSIS — K59.00 CONSTIPATION, UNSPECIFIED CONSTIPATION TYPE: ICD-10-CM

## 2024-10-14 PROCEDURE — 88305 TISSUE EXAM BY PATHOLOGIST: CPT | Performed by: PATHOLOGY

## 2024-10-14 PROCEDURE — 93248 EXT ECG>7D<15D REV&INTERPJ: CPT | Performed by: INTERNAL MEDICINE

## 2024-10-14 PROCEDURE — 45380 COLONOSCOPY AND BIOPSY: CPT | Performed by: INTERNAL MEDICINE

## 2024-10-14 PROCEDURE — 45385 COLONOSCOPY W/LESION REMOVAL: CPT | Performed by: INTERNAL MEDICINE

## 2024-10-14 RX ORDER — PROPOFOL 10 MG/ML
INJECTION, EMULSION INTRAVENOUS AS NEEDED
Status: DISCONTINUED | OUTPATIENT
Start: 2024-10-14 | End: 2024-10-14

## 2024-10-14 RX ORDER — SODIUM CHLORIDE, SODIUM LACTATE, POTASSIUM CHLORIDE, CALCIUM CHLORIDE 600; 310; 30; 20 MG/100ML; MG/100ML; MG/100ML; MG/100ML
INJECTION, SOLUTION INTRAVENOUS CONTINUOUS PRN
Status: DISCONTINUED | OUTPATIENT
Start: 2024-10-14 | End: 2024-10-14

## 2024-10-14 RX ORDER — LIDOCAINE HYDROCHLORIDE 20 MG/ML
INJECTION, SOLUTION EPIDURAL; INFILTRATION; INTRACAUDAL; PERINEURAL AS NEEDED
Status: DISCONTINUED | OUTPATIENT
Start: 2024-10-14 | End: 2024-10-14

## 2024-10-14 RX ORDER — LINACLOTIDE 290 UG/1
290 CAPSULE, GELATIN COATED ORAL DAILY
Qty: 30 CAPSULE | Refills: 11 | Status: SHIPPED | OUTPATIENT
Start: 2024-10-14 | End: 2025-10-09

## 2024-10-14 RX ADMIN — Medication 40 MG: at 14:50

## 2024-10-14 RX ADMIN — SODIUM CHLORIDE, SODIUM LACTATE, POTASSIUM CHLORIDE, AND CALCIUM CHLORIDE: .6; .31; .03; .02 INJECTION, SOLUTION INTRAVENOUS at 14:27

## 2024-10-14 RX ADMIN — PROPOFOL 130 MG: 10 INJECTION, EMULSION INTRAVENOUS at 14:40

## 2024-10-14 RX ADMIN — LIDOCAINE HYDROCHLORIDE 100 MG: 20 INJECTION, SOLUTION EPIDURAL; INFILTRATION; INTRACAUDAL at 14:40

## 2024-10-14 RX ADMIN — PROPOFOL 20 MG: 10 INJECTION, EMULSION INTRAVENOUS at 14:50

## 2024-10-14 RX ADMIN — PROPOFOL 20 MG: 10 INJECTION, EMULSION INTRAVENOUS at 14:44

## 2024-10-14 RX ADMIN — PROPOFOL 20 MG: 10 INJECTION, EMULSION INTRAVENOUS at 14:47

## 2024-10-14 NOTE — H&P
"History and Physical -  [facility]  Suly Krause 64 y.o. female MRN: [id number]        HPI: 64-year-old female with history of colon polyps reports having issues with constipation.  Denies any abdominal pain.    Historical Information   Past Medical History:   Diagnosis Date    Disease of thyroid gland     Diverticulitis of colon     GERD (gastroesophageal reflux disease)     High cholesterol     Hypertension     Stroke (HCC)      Past Surgical History:   Procedure Laterality Date    CARPAL TUNNEL RELEASE       SECTION      CHOLECYSTECTOMY      COLONOSCOPY       Social History   Social History     Substance and Sexual Activity   Alcohol Use Yes    Alcohol/week: 1.0 standard drink of alcohol    Types: 1 Glasses of wine per week    Comment: 1 a day     Social History     Substance and Sexual Activity   Drug Use Never     Social History     Tobacco Use   Smoking Status Every Day    Current packs/day: 0.25    Average packs/day: 0.3 packs/day for 46.4 years (11.6 ttl pk-yrs)    Types: Cigarettes    Start date: 1978   Smokeless Tobacco Never     Family History   Problem Relation Age of Onset    COPD Mother     Heart disease Father     Heart attack Father     Crohn's disease Sister     Colon cancer Maternal Grandmother     Alzheimer's disease Paternal Grandmother     Heart disease Paternal Grandfather        Meds/Allergies     Not in a hospital admission.    Allergies   Allergen Reactions    Latex Rash    Tomato (Diagnostic) - Food Allergy Blisters       Objective     Blood pressure 157/74, pulse 73, temperature (!) 97.1 °F (36.2 °C), temperature source Temporal, resp. rate 20, height 5' 3\" (1.6 m), weight 58.5 kg (129 lb), SpO2 95%.    Physical Exam:    Chest- CTA  Heart- RRR  Abdomen- NT/ND  Extremities- No edema    ASSESSMENT:     Constipation, history of colon polyps    PLAN:    Colonoscopy              "

## 2024-10-14 NOTE — ANESTHESIA PREPROCEDURE EVALUATION
Procedure:  COLONOSCOPY    Relevant Problems   CARDIO   (+) Essential hypertension   (+) Mixed hyperlipidemia   (+) Paroxysmal atrial fibrillation (HCC)      ENDO   (+) Acquired hypothyroidism   (+) Type 2 diabetes mellitus without complication, without long-term current use of insulin (HCC)      MUSCULOSKELETAL   (+) Chronic bilateral low back pain with right-sided sciatica   (+) Rheumatoid arthritis with positive rheumatoid factor (HCC)      NEURO/PSYCH   (+) Chronic bilateral low back pain with right-sided sciatica     EKG 2024:  Sinus with occasional PACs        Liquid Prep finished this AM  Last ate food on Saturday    Physical Exam    Airway    Mallampati score: II         Dental    lower dentures and upper dentures    Cardiovascular  Rhythm: regular, Rate: normal, Cardiovascular exam normal    Pulmonary  Pulmonary exam normal Breath sounds clear to auscultation, Breath sounds normal    Other Findings  post-pubertal.      Anesthesia Plan  ASA Score- 2     Anesthesia Type- IV sedation with anesthesia with ASA Monitors.         Additional Monitors:     Airway Plan:            Plan Factors-Exercise tolerance (METS): >4 METS.Exercise comment: Walks 20mins 3x/day.    Chart reviewed. EKG reviewed.   Patient summary reviewed.    Patient is a current smoker (Currently cutting back, down to 3 cigs per day).  Patient smoked on day of surgery.            Induction- intravenous.    Postoperative Plan-     Perioperative Resuscitation Plan - Level 1 - Full Code.       Informed Consent- Anesthetic plan and risks discussed with patient.  I personally reviewed this patient with the CRNA. Discussed and agreed on the Anesthesia Plan with the CRNA..

## 2024-10-14 NOTE — ANESTHESIA POSTPROCEDURE EVALUATION
Post-Op Assessment Note            No anethesia notable event occurred.            Last Filed PACU Vitals:  Vitals Value Taken Time   Temp 97.5 °F (36.4 °C) 10/14/24 1459   Pulse 63 10/14/24 1517   /70 10/14/24 1517   Resp 17 10/14/24 1517   SpO2 99 % 10/14/24 1517       Modified Gurmeet:  Activity: 2 (10/14/2024  3:17 PM)  Respiration: 2 (10/14/2024  3:17 PM)  Circulation: 2 (10/14/2024  3:17 PM)  Consciousness: 2 (10/14/2024  3:17 PM)  Oxygen Saturation: 2 (10/14/2024  3:17 PM)  Modified Gurmeet Score: 10 (10/14/2024  3:17 PM)

## 2024-10-14 NOTE — ANESTHESIA POSTPROCEDURE EVALUATION
Post-Op Assessment Note    CV Status:  Stable  Pain Score: 0    Pain management: adequate       Mental Status:  Awake and alert   Hydration Status:  Stable   PONV Controlled:  None   Airway Patency:  Patent and adequate     Post Op Vitals Reviewed: Yes    No anethesia notable event occurred.    Staff: CRNA, Anesthesiologist           Last Filed PACU Vitals:  Vitals Value Taken Time   Temp     Pulse 72    /64    Resp 16    SpO2 99        Modified Gurmeet:  Activity: 2 (10/14/2024  1:42 PM)  Respiration: 2 (10/14/2024  1:42 PM)  Circulation: 2 (10/14/2024  1:42 PM)  Consciousness: 2 (10/14/2024  1:42 PM)  Oxygen Saturation: 2 (10/14/2024  1:42 PM)  Modified Gurmeet Score: 10 (10/14/2024  1:42 PM)

## 2024-10-17 PROCEDURE — 88305 TISSUE EXAM BY PATHOLOGIST: CPT | Performed by: PATHOLOGY

## 2024-10-22 ENCOUNTER — TELEPHONE (OUTPATIENT)
Age: 64
End: 2024-10-22

## 2024-10-22 NOTE — TELEPHONE ENCOUNTER
"Pt calling re: results she saw on her portal. I read Dr. Leiva's note to pt \"Colon polyps removed came back as precancerous adenomas.  Repeat colonoscopy in 5 years\" Pt understood and thanked me.  "

## 2024-10-22 NOTE — TELEPHONE ENCOUNTER
Patients GI provider:  JOANNE Mccarthy     Number to return call: [phone]    Reason for call: Pt calling stating [facility] informed her that [employer] needs prior auth. Pt aware that it can take a few business days. Thank you.    Scheduled procedure/appointment date if applicable: N/A

## 2024-10-23 NOTE — TELEPHONE ENCOUNTER
PA for Linzess 290 mcg SUBMITTED     via    []CMM-KEY:   []Surescripts-Case ID #   []Availity-Auth ID # NDC #   []Faxed to plan   [x]Other website Prompt JOANNE Olivier [id number]   []Phone call Case ID #   Office notes sent, clinical questions answered. Awaiting determination    Turnaround time for your insurance to make a decision on your Prior Authorization can take 7-21 business days.

## 2024-10-25 NOTE — TELEPHONE ENCOUNTER
PA for linzess  DENIED    Reason        Message sent to office clinical pool Yes    Denial letter scanned into Media Yes    Appeal started No (Provider will need to decide if appeal is warranted and send clinical documentation to Prior Authorization Team for initiation.)    **Please follow up with your patient regarding denial and next steps**

## 2024-10-28 ENCOUNTER — HOSPITAL ENCOUNTER (OUTPATIENT)
Dept: NON INVASIVE DIAGNOSTICS | Facility: MEDICAL CENTER | Age: 64
Discharge: HOME | End: 2024-10-28
Payer: COMMERCIAL

## 2024-10-28 ENCOUNTER — HOSPITAL ENCOUNTER (OUTPATIENT)
Dept: RADIOLOGY | Facility: MEDICAL CENTER | Age: 64
Discharge: HOME | End: 2024-10-28
Payer: COMMERCIAL

## 2024-10-28 VITALS
SYSTOLIC BLOOD PRESSURE: 130 MMHG | HEIGHT: 63 IN | HEART RATE: 63 BPM | BODY MASS INDEX: 22.86 KG/M2 | WEIGHT: 129 LBS | DIASTOLIC BLOOD PRESSURE: 70 MMHG

## 2024-10-28 DIAGNOSIS — E78.5 HYPERLIPIDEMIA, UNSPECIFIED HYPERLIPIDEMIA TYPE: ICD-10-CM

## 2024-10-28 DIAGNOSIS — Z86.73 HISTORY OF MULTIPLE STROKES: ICD-10-CM

## 2024-10-28 DIAGNOSIS — I49.1 PAC (PREMATURE ATRIAL CONTRACTION): ICD-10-CM

## 2024-10-28 DIAGNOSIS — Z86.79 HISTORY OF CAROTID ARTERY DISEASE: ICD-10-CM

## 2024-10-28 DIAGNOSIS — R00.2 PALPITATIONS: ICD-10-CM

## 2024-10-28 LAB
AORTIC ROOT: 2.8 CM
APICAL FOUR CHAMBER EJECTION FRACTION: 70 %
BSA FOR ECHO PROCEDURE: 1.61 M2
E WAVE DECELERATION TIME: 119 MS
E/A RATIO: 0.71
FRACTIONAL SHORTENING: 41 (ref 28–44)
INTERVENTRICULAR SEPTUM IN DIASTOLE (PARASTERNAL SHORT AXIS VIEW): 0.9 CM
INTERVENTRICULAR SEPTUM: 0.9 CM (ref 0.6–1.1)
LAAS-AP2: 24.1 CM2
LAAS-AP4: 14 CM2
LEFT ATRIUM SIZE: 3.1 CM
LEFT ATRIUM VOLUME (MOD BIPLANE): 55 ML
LEFT ATRIUM VOLUME INDEX (MOD BIPLANE): 34.4 ML/M2
LEFT INTERNAL DIMENSION IN SYSTOLE: 2.6 CM (ref 2.1–4)
LEFT VENTRICULAR INTERNAL DIMENSION IN DIASTOLE: 4.4 CM (ref 3.5–6)
LEFT VENTRICULAR POSTERIOR WALL IN END DIASTOLE: 1 CM
LEFT VENTRICULAR STROKE VOLUME: 64 ML
LVSV (TEICH): 64 ML
MV E'TISSUE VEL-LAT: 10 CM/S
MV E'TISSUE VEL-SEP: 10 CM/S
MV PEAK A VEL: 0.83 M/S
MV PEAK E VEL: 59 CM/S
MV STENOSIS PRESSURE HALF TIME: 35 MS
MV VALVE AREA P 1/2 METHOD: 6.29
RA PRESSURE ESTIMATED: 8 MMHG
RIGHT ATRIUM AREA SYSTOLE A4C: 13.3 CM2
RIGHT VENTRICLE ID DIMENSION: 3.4 CM
RV PSP: 33 MMHG
SL CV LEFT ATRIUM LENGTH A2C: 5.5 CM
SL CV LV EF: 60
SL CV PED ECHO LEFT VENTRICLE DIASTOLIC VOLUME (MOD BIPLANE) 2D: 90 ML
SL CV PED ECHO LEFT VENTRICLE SYSTOLIC VOLUME (MOD BIPLANE) 2D: 26 ML
TR MAX PG: 25 MMHG
TR PEAK VELOCITY: 2.5 M/S
TRICUSPID ANNULAR PLANE SYSTOLIC EXCURSION: 2.5 CM
TRICUSPID VALVE PEAK REGURGITATION VELOCITY: 2.49 M/S

## 2024-10-28 PROCEDURE — 93880 EXTRACRANIAL BILAT STUDY: CPT

## 2024-10-28 PROCEDURE — 93306 TTE W/DOPPLER COMPLETE: CPT

## 2024-10-28 PROCEDURE — 93880 EXTRACRANIAL BILAT STUDY: CPT | Performed by: SURGERY

## 2024-10-28 PROCEDURE — 93306 TTE W/DOPPLER COMPLETE: CPT | Performed by: INTERNAL MEDICINE

## 2024-10-30 DIAGNOSIS — K59.04 CHRONIC IDIOPATHIC CONSTIPATION: Primary | ICD-10-CM

## 2024-10-30 RX ORDER — LINACLOTIDE 145 UG/1
145 CAPSULE, GELATIN COATED ORAL
Qty: 30 CAPSULE | Refills: 5 | Status: SHIPPED | OUTPATIENT
Start: 2024-10-30

## 2024-11-22 ENCOUNTER — VBI (OUTPATIENT)
Dept: ADMINISTRATIVE | Facility: OTHER | Age: 64
End: 2024-11-22

## 2024-11-22 NOTE — TELEPHONE ENCOUNTER
11/22/24 9:49 AM     Chart reviewed for Diabetic Eye Exam was/were submitted to the patient's insurance.     Mireya Winter MA   [facility]

## 2024-12-12 ENCOUNTER — HOSPITAL ENCOUNTER (OUTPATIENT)
Dept: RADIOLOGY | Facility: MEDICAL CENTER | Age: 64
Discharge: HOME | End: 2024-12-12
Payer: COMMERCIAL

## 2024-12-12 VITALS — HEIGHT: 63 IN | BODY MASS INDEX: 22.86 KG/M2 | WEIGHT: 129 LBS

## 2024-12-12 DIAGNOSIS — Z12.31 ENCOUNTER FOR SCREENING MAMMOGRAM FOR BREAST CANCER: ICD-10-CM

## 2024-12-12 PROCEDURE — 77063 BREAST TOMOSYNTHESIS BI: CPT

## 2024-12-12 PROCEDURE — 77067 SCR MAMMO BI INCL CAD: CPT

## 2024-12-14 ENCOUNTER — VBI (OUTPATIENT)
Dept: ADMINISTRATIVE | Facility: OTHER | Age: 64
End: 2024-12-14

## 2024-12-14 NOTE — TELEPHONE ENCOUNTER
12/14/24 12:10 PM     Chart reviewed for Cervical Cancer Screening was/were not submitted to the patient's insurance.     Mireya Winter MA   [facility]

## 2024-12-26 DIAGNOSIS — M05.9 RHEUMATOID ARTHRITIS WITH POSITIVE RHEUMATOID FACTOR, INVOLVING UNSPECIFIED SITE (HCC): Primary | ICD-10-CM

## 2024-12-26 DIAGNOSIS — M05.9 RHEUMATOID ARTHRITIS WITH POSITIVE RHEUMATOID FACTOR, INVOLVING UNSPECIFIED SITE (HCC): ICD-10-CM

## 2024-12-26 RX ORDER — METHOTREXATE 2.5 MG/1
20 TABLET ORAL WEEKLY
Qty: 32 TABLET | Refills: 5 | Status: SHIPPED | OUTPATIENT
Start: 2024-12-26

## 2024-12-28 ENCOUNTER — OFFICE VISIT (OUTPATIENT)
Dept: URGENT CARE | Facility: MEDICAL CENTER | Age: 64
End: 2024-12-28
Payer: COMMERCIAL

## 2024-12-28 VITALS
WEIGHT: 129 LBS | BODY MASS INDEX: 22.86 KG/M2 | TEMPERATURE: 98.2 F | OXYGEN SATURATION: 96 % | DIASTOLIC BLOOD PRESSURE: 89 MMHG | SYSTOLIC BLOOD PRESSURE: 139 MMHG | RESPIRATION RATE: 20 BRPM | HEIGHT: 63 IN | HEART RATE: 60 BPM

## 2024-12-28 DIAGNOSIS — J02.9 SORE THROAT: Primary | ICD-10-CM

## 2024-12-28 DIAGNOSIS — J35.8 TONSILLITH: ICD-10-CM

## 2024-12-28 LAB — S PYO AG THROAT QL: NEGATIVE

## 2024-12-28 PROCEDURE — 87070 CULTURE OTHR SPECIMN AEROBIC: CPT | Performed by: PHYSICIAN ASSISTANT

## 2024-12-28 PROCEDURE — 99213 OFFICE O/P EST LOW 20 MIN: CPT | Performed by: PHYSICIAN ASSISTANT

## 2024-12-28 PROCEDURE — S9088 SERVICES PROVIDED IN URGENT: HCPCS | Performed by: PHYSICIAN ASSISTANT

## 2024-12-28 PROCEDURE — 87880 STREP A ASSAY W/OPTIC: CPT | Performed by: PHYSICIAN ASSISTANT

## 2024-12-28 NOTE — PATIENT INSTRUCTIONS
Motrin as needed for pain  Use antiseptic mouth rinse 3-4 x daily until resolved  Follow-up with PCP if symptoms worsen or persist.

## 2024-12-28 NOTE — PROGRESS NOTES
"  [facility]        NAME: Suly Krause is a 64 y.o. female  : 1960    MRN: [id number]  DATE: 2024  TIME: 10:59 AM    Assessment and Plan   Sore throat [J02.9]  1. Sore throat  POCT rapid ANTIGEN strepA      2. Tonsillith              Patient Instructions     Motrin as needed for pain  Use antiseptic mouth rinse 3-4 x daily until resolved  Follow-up with PCP if symptoms worsen or persist.       If tests have been performed at [facility], our office will contact you with results if changes need to be made to the care plan discussed with you at the visit.  You can review your full results on [facility].    Chief Complaint     Chief Complaint   Patient presents with    Sore Throat     Sore throat, right ear pain; 1 week          History of Present Illness       Suly is a 64-year-old female who presents with a 1 week history of acute onset sore throat.  Patient also reports she has discomfort in her right ear.  She has had no fever or nasal symptoms.  Patient reports she noticed a white \"dot\" on her right tonsil approximately 3 days prior.    Sore Throat         Review of Systems   Review of Systems   Constitutional: Negative.    HENT:  Positive for sore throat.    Respiratory: Negative.     Gastrointestinal: Negative.          Current Medications       Current Outpatient Medications:     aspirin 81 mg chewable tablet, Chew 81 mg daily, Disp: , Rfl:     atorvastatin (LIPITOR) 20 mg tablet, Take 20 mg by mouth daily, Disp: , Rfl:     Cholecalciferol 25 MCG (1000 UT) capsule, Take 1 capsule by mouth, Disp: , Rfl:     folic acid (FOLVITE) 1 mg tablet, Take 1 mg by mouth daily, Disp: , Rfl:     levothyroxine 88 mcg tablet, Take 88 mcg by mouth daily, Disp: , Rfl:     linaCLOtide (Linzess) 145 MCG CAPS, Take 1 capsule (145 mcg total) by mouth daily before breakfast, Disp: 30 capsule, Rfl: 5    lisinopril (ZESTRIL) 20 mg tablet, Take 20 mg by mouth daily, Disp: , Rfl:     methotrexate 2.5 MG " "tablet, Take 8 tablets (20 mg total) by mouth once a week Take 4 tablets in the morning and 4 tablets in the evening., Disp: 32 tablet, Rfl: 5    naproxen sodium (Aleve) 220 MG tablet, Take 220 mg by mouth every 12 (twelve) hours as needed, Disp: , Rfl:     polyethylene glycol (GLYCOLAX) 17 GM/SCOOP powder, Take 17 g by mouth 2 (two) times a day, Disp: 238 g, Rfl: 2    polyethylene glycol (GOLYTELY) 4000 mL solution, Take 4,000 mL by mouth once for 1 dose, Disp: 4000 mL, Rfl: 0    Current Allergies     Allergies as of 2024 - Reviewed 2024   Allergen Reaction Noted    Latex Rash 2024    Tomato (diagnostic) - food allergy Blisters 2024            The following portions of the patient's history were reviewed and updated as appropriate: allergies, current medications, past family history, past medical history, past social history, past surgical history and problem list.     Past Medical History:   Diagnosis Date    Disease of thyroid gland     Diverticulitis of colon     GERD (gastroesophageal reflux disease)     High cholesterol     Hypertension     Stroke (HCC)        Past Surgical History:   Procedure Laterality Date    CARPAL TUNNEL RELEASE       SECTION      CHOLECYSTECTOMY      COLONOSCOPY         Family History   Problem Relation Age of Onset    COPD Mother     Heart disease Father     Heart attack Father     Crohn's disease Sister     No Known Problems Sister     No Known Problems Sister     No Known Problems Daughter     No Known Problems Daughter     Colon cancer Maternal Grandmother     No Known Problems Maternal Grandfather     Alzheimer's disease Paternal Grandmother     Heart disease Paternal Grandfather     No Known Problems Other     No Known Problems Son     No Known Problems Son          Medications have been verified.        Objective   /89   Pulse 60   Temp 98.2 °F (36.8 °C) (Temporal)   Resp 20   Ht 5' 3\" (1.6 m)   Wt 58.5 kg (129 lb)   SpO2 96%   BMI " 22.85 kg/m²   No LMP recorded. Patient is postmenopausal.       Physical Exam     Physical Exam  Constitutional:       General: She is not in acute distress.     Appearance: She is well-developed. She is not ill-appearing.   HENT:      Head: Normocephalic and atraumatic.      Right Ear: Tympanic membrane and ear canal normal.      Left Ear: Tympanic membrane and ear canal normal.      Nose: Nose normal.      Mouth/Throat:      Pharynx: Posterior oropharyngeal erythema present.      Comments: There is a 1 to 2 mm pustule located on the right tonsil.  No significant swelling, no deviation of the uvula.  Cardiovascular:      Rate and Rhythm: Normal rate and regular rhythm.      Heart sounds: Normal heart sounds, S1 normal and S2 normal. No murmur heard.  Pulmonary:      Effort: Pulmonary effort is normal.      Breath sounds: Normal breath sounds and air entry.   Neurological:      Mental Status: She is alert.

## 2024-12-29 LAB — BACTERIA THROAT CULT: NORMAL

## 2024-12-31 ENCOUNTER — RESULTS FOLLOW-UP (OUTPATIENT)
Dept: URGENT CARE | Facility: MEDICAL CENTER | Age: 64
End: 2024-12-31

## 2024-12-31 LAB — BACTERIA THROAT CULT: ABNORMAL

## 2025-01-31 DIAGNOSIS — M05.9 RHEUMATOID ARTHRITIS WITH POSITIVE RHEUMATOID FACTOR, INVOLVING UNSPECIFIED SITE (HCC): ICD-10-CM

## 2025-01-31 RX ORDER — METHOTREXATE 2.5 MG/1
TABLET ORAL
Qty: 96 TABLET | Refills: 2 | Status: SHIPPED | OUTPATIENT
Start: 2025-01-31

## 2025-02-17 ENCOUNTER — OFFICE VISIT (OUTPATIENT)
Dept: FAMILY MEDICINE CLINIC | Facility: MEDICAL CENTER | Age: 65
End: 2025-02-17
Payer: COMMERCIAL

## 2025-02-17 VITALS
WEIGHT: 136.2 LBS | TEMPERATURE: 98.5 F | RESPIRATION RATE: 18 BRPM | DIASTOLIC BLOOD PRESSURE: 60 MMHG | HEIGHT: 63 IN | SYSTOLIC BLOOD PRESSURE: 120 MMHG | OXYGEN SATURATION: 97 % | HEART RATE: 70 BPM | BODY MASS INDEX: 24.13 KG/M2

## 2025-02-17 DIAGNOSIS — R06.02 SOB (SHORTNESS OF BREATH) ON EXERTION: ICD-10-CM

## 2025-02-17 DIAGNOSIS — M05.79 RHEUMATOID ARTHRITIS INVOLVING MULTIPLE SITES WITH POSITIVE RHEUMATOID FACTOR (HCC): ICD-10-CM

## 2025-02-17 DIAGNOSIS — Z86.73 CEREBROVASCULAR DISEASE, ARTERIOSCLEROTIC, POST-STROKE: ICD-10-CM

## 2025-02-17 DIAGNOSIS — G89.29 CHRONIC BILATERAL LOW BACK PAIN WITH RIGHT-SIDED SCIATICA: ICD-10-CM

## 2025-02-17 DIAGNOSIS — I10 ESSENTIAL HYPERTENSION: ICD-10-CM

## 2025-02-17 DIAGNOSIS — E78.2 MIXED HYPERLIPIDEMIA: ICD-10-CM

## 2025-02-17 DIAGNOSIS — I67.2 CEREBROVASCULAR DISEASE, ARTERIOSCLEROTIC, POST-STROKE: ICD-10-CM

## 2025-02-17 DIAGNOSIS — E11.9 TYPE 2 DIABETES MELLITUS WITHOUT COMPLICATION, WITHOUT LONG-TERM CURRENT USE OF INSULIN (HCC): Primary | ICD-10-CM

## 2025-02-17 DIAGNOSIS — M54.41 CHRONIC BILATERAL LOW BACK PAIN WITH RIGHT-SIDED SCIATICA: ICD-10-CM

## 2025-02-17 DIAGNOSIS — E03.9 ACQUIRED HYPOTHYROIDISM: ICD-10-CM

## 2025-02-17 DIAGNOSIS — J02.9 PHARYNGITIS, UNSPECIFIED ETIOLOGY: ICD-10-CM

## 2025-02-17 DIAGNOSIS — F17.210 CIGARETTE NICOTINE DEPENDENCE WITHOUT COMPLICATION: ICD-10-CM

## 2025-02-17 DIAGNOSIS — Z00.00 ENCOUNTER FOR WELLNESS EXAMINATION IN ADULT: ICD-10-CM

## 2025-02-17 DIAGNOSIS — L98.9 SKIN LESION OF FACE: ICD-10-CM

## 2025-02-17 DIAGNOSIS — K64.9 HEMORRHOIDS, UNSPECIFIED HEMORRHOID TYPE: ICD-10-CM

## 2025-02-17 PROBLEM — I48.0 PAROXYSMAL ATRIAL FIBRILLATION (HCC): Status: RESOLVED | Noted: 2024-07-16 | Resolved: 2025-02-17

## 2025-02-17 PROBLEM — R73.03 PREDIABETES: Status: RESOLVED | Noted: 2019-04-08 | Resolved: 2025-02-17

## 2025-02-17 PROBLEM — R19.4 CHANGE IN BOWEL HABITS: Status: RESOLVED | Noted: 2024-07-16 | Resolved: 2025-02-17

## 2025-02-17 PROBLEM — R73.03 PREDIABETES: Status: ACTIVE | Noted: 2019-04-08

## 2025-02-17 LAB — SL AMB POCT HEMOGLOBIN AIC: 6 (ref ?–6.5)

## 2025-02-17 PROCEDURE — 83036 HEMOGLOBIN GLYCOSYLATED A1C: CPT | Performed by: INTERNAL MEDICINE

## 2025-02-17 PROCEDURE — 99214 OFFICE O/P EST MOD 30 MIN: CPT | Performed by: INTERNAL MEDICINE

## 2025-02-17 PROCEDURE — 99396 PREV VISIT EST AGE 40-64: CPT | Performed by: INTERNAL MEDICINE

## 2025-02-17 NOTE — PROGRESS NOTES
Diabetic Foot Exam    Patient's shoes and socks removed.    Right Foot/Ankle   Right Foot Inspection  Skin Exam: skin normal and skin intact. No dry skin, no warmth, no callus, no erythema, no maceration, no abnormal color, no pre-ulcer, no ulcer and no callus.     Toe Exam: ROM and strength within normal limits.     Sensory   Monofilament testing: intact    Vascular  The right DP pulse is 2+. The right PT pulse is 2+.     Left Foot/Ankle  Left Foot Inspection  Skin Exam: skin normal and skin intact. No dry skin, no warmth, no erythema, no maceration, normal color, no pre-ulcer, no ulcer and no callus.     Toe Exam: ROM and strength within normal limits.     Sensory   Monofilament testing: intact    Vascular  The left DP pulse is 2+. The left PT pulse is 2+.     Assign Risk Category  No deformity present  No loss of protective sensation  No weak pulses  Risk: 0        Adult Annual Physical  Name: Suly Krause      : 1960      MRN: [id number]  Encounter Provider: Pako Owens MD  Encounter Date: 2025   Encounter department: [facility]    Assessment & Plan  Type 2 diabetes mellitus without complication, without long-term current use of insulin ([facility])    Lab Results   Component Value Date    HGBA1C 6 2025     Very well-controlled with the diet.  Continue on low carbohydrate diet  Orders:    POCT hemoglobin A1c    Albumin / creatinine urine ratio; Future    Comprehensive metabolic panel; Future    Hemoglobin A1C; Future    CBC and differential; Future    Lipid Panel with Direct LDL reflex; Future    TSH, 3rd generation with Free T4 reflex; Future    Essential hypertension  Blood pressure stable, continue same medication       Mixed hyperlipidemia  Continue atorvastatin       Acquired hypothyroidism  Continue levothyroxine at the same dose, check a TSH level       Cerebrovascular disease, arteriosclerotic, post-stroke  Stable       Rheumatoid arthritis involving multiple sites  with positive rheumatoid factor (HCC)  Follow-up with rheumatology and continue methotrexate       Chronic bilateral low back pain with right-sided sciatica  Stable, was advised to take Tylenol as needed       Hemorrhoids, unspecified hemorrhoid type  Follow-up with GI       Pharyngitis, unspecified etiology    Orders:    Ambulatory Referral to Otolaryngology; Future    Cigarette nicotine dependence without complication  Was counseled to quit smoking       Encounter for wellness examination in adult         Immunizations and preventive care screenings were discussed with patient today. Appropriate education was printed on patient's after visit summary.    Counseling:  Alcohol/drug use: discussed moderation in alcohol intake, the recommendations for healthy alcohol use, and avoidance of illicit drug use.  Dental Health: discussed importance of regular tooth brushing, flossing, and dental visits.  Injury prevention: discussed safety/seat belts, safety helmets, smoke detectors, carbon monoxide detectors, and smoking near bedding or upholstery.  Sexual health: discussed sexually transmitted diseases, partner selection, use of condoms, avoidance of unintended pregnancy, and contraceptive alternatives.  Exercise: the importance of regular exercise/physical activity was discussed. Recommend exercise 3-5 times per week for at least 30 minutes.       Depression Screening and Follow-up Plan: Patient was screened for depression during today's encounter. They screened negative with a PHQ-2 score of 2.      Tobacco Cessation Counseling: Tobacco cessation counseling was provided. The patient is sincerely urged to quit consumption of tobacco. She is not ready to quit tobacco.         History of Present Illness   Patient is here for follow-up of type 2 diabetes, hypertension, hyperlipidemia, hypothyroidism, back pain, pharyngitis, shortness of breath on exertion.  Has been taking medication for blood pressure and cholesterol as  well as thyroid regularly.  Diabetes is diet controlled.  Has a history of CVA and has mild numbness on the right side of her face and hearing loss in her right ear.  Is not able to follow-up with rheumatology as she has problems with transportation and her rheumatologist is in [place].  Has constipation which causes hemorrhoids but Linzess was not covered by her insurance  Has chronic pharyngitis and needs to see ENT.  Has shortness of breath on exertion for which she was seen by the cardiologist.  Never had a PFT done.  Was told to see the pulmonologist  Has a lesion on the forehead which needs to be seen by the dermatologist      Adult Annual Physical:  Patient presents for annual physical.     Diet and Physical Activity:  - Diet/Nutrition: well balanced diet and consuming 3-5 servings of fruits/vegetables daily.  - Exercise: walking.    Depression Screening:  - PHQ-2 Score: 2    General Health:  - Sleep: 4-6 hours of sleep on average.  - Hearing: decreased hearing right ear.  - Vision: vision problems.  - Dental: no dental visits for > 1 year.    /GYN Health:  - Follows with GYN: no.   - Menopause: postmenopausal.   - Contraception: menopause.      Advanced Care Planning:  - Has an advanced directive?: no      Review of Systems   Constitutional:  Negative for chills, diaphoresis, fatigue and fever.   HENT:  Negative for congestion, ear discharge, ear pain, hearing loss, postnasal drip, rhinorrhea, sinus pressure, sinus pain, sneezing, sore throat and voice change.    Eyes:  Negative for pain, discharge, redness and visual disturbance.   Respiratory:  Positive for shortness of breath. Negative for cough, chest tightness and wheezing.    Cardiovascular:  Negative for chest pain, palpitations and leg swelling.   Gastrointestinal:  Positive for constipation. Negative for abdominal distention, abdominal pain, blood in stool, diarrhea, nausea and vomiting.   Endocrine: Negative for cold intolerance, heat  "intolerance, polydipsia, polyphagia and polyuria.   Genitourinary:  Negative for dysuria, flank pain, frequency, hematuria and urgency.   Musculoskeletal:  Positive for arthralgias and back pain. Negative for gait problem, joint swelling, myalgias, neck pain and neck stiffness.   Skin:  Negative for rash.   Neurological:  Positive for weakness and numbness. Negative for dizziness, tremors, syncope, facial asymmetry, speech difficulty, light-headedness and headaches.   Hematological:  Does not bruise/bleed easily.   Psychiatric/Behavioral:  Negative for behavioral problems, confusion and sleep disturbance. The patient is not nervous/anxious.      Medical History Reviewed by provider this encounter:  Tobacco  Allergies  Meds  Problems  Med Hx  Surg Hx  Fam Hx     .    Objective   /60 (BP Location: Left arm, Patient Position: Sitting, Cuff Size: Large)   Pulse 70   Temp 98.5 °F (36.9 °C) (Temporal)   Resp 18   Ht 5' 3\" (1.6 m)   Wt 61.8 kg (136 lb 3.2 oz)   SpO2 97%   BMI 24.13 kg/m²     Physical Exam  Constitutional:       General: She is not in acute distress.     Appearance: She is well-developed. She is not diaphoretic.   HENT:      Head: Normocephalic and atraumatic.      Right Ear: External ear normal.      Left Ear: External ear normal.      Nose: Nose normal.   Eyes:      General: No scleral icterus.        Right eye: No discharge.         Left eye: No discharge.      Conjunctiva/sclera: Conjunctivae normal.   Cardiovascular:      Rate and Rhythm: Normal rate and regular rhythm.      Pulses: no weak pulses.           Dorsalis pedis pulses are 2+ on the right side and 2+ on the left side.        Posterior tibial pulses are 2+ on the right side and 2+ on the left side.      Heart sounds: Normal heart sounds. No murmur heard.     No friction rub. No gallop.   Pulmonary:      Effort: Pulmonary effort is normal. No respiratory distress.      Breath sounds: Normal breath sounds. No wheezing or " rales.   Abdominal:      General: Bowel sounds are normal. There is no distension.      Palpations: Abdomen is soft.      Tenderness: There is no abdominal tenderness. There is no guarding or rebound.   Musculoskeletal:         General: No tenderness.   Feet:      Right foot:      Skin integrity: No ulcer, skin breakdown, erythema, warmth, callus or dry skin.      Left foot:      Skin integrity: No ulcer, skin breakdown, erythema, warmth, callus or dry skin.   Skin:     General: Skin is warm and dry.      Findings: No erythema or rash.   Neurological:      Mental Status: She is alert and oriented to person, place, and time.      Cranial Nerves: No cranial nerve deficit.      Sensory: No sensory deficit.      Motor: No abnormal muscle tone.   Psychiatric:         Behavior: Behavior normal.

## 2025-02-17 NOTE — ASSESSMENT & PLAN NOTE
Lab Results   Component Value Date    HGBA1C 6 02/17/2025     Very well-controlled with the diet.  Continue on low carbohydrate diet  Orders:    POCT hemoglobin A1c    Albumin / creatinine urine ratio; Future    Comprehensive metabolic panel; Future    Hemoglobin A1C; Future    CBC and differential; Future    Lipid Panel with Direct LDL reflex; Future    TSH, 3rd generation with Free T4 reflex; Future

## 2025-02-18 ENCOUNTER — HOSPITAL ENCOUNTER (OUTPATIENT)
Dept: MAMMOGRAPHY | Facility: CLINIC | Age: 65
Discharge: HOME | End: 2025-02-18
Payer: COMMERCIAL

## 2025-02-18 ENCOUNTER — HOSPITAL ENCOUNTER (OUTPATIENT)
Dept: ULTRASOUND IMAGING | Facility: CLINIC | Age: 65
Discharge: HOME | End: 2025-02-18
Payer: COMMERCIAL

## 2025-02-18 VITALS — BODY MASS INDEX: 24.1 KG/M2 | HEIGHT: 63 IN | WEIGHT: 136 LBS

## 2025-02-18 DIAGNOSIS — R92.8 ABNORMAL SCREENING MAMMOGRAM: ICD-10-CM

## 2025-02-18 PROCEDURE — 77065 DX MAMMO INCL CAD UNI: CPT

## 2025-02-18 PROCEDURE — G0279 TOMOSYNTHESIS, MAMMO: HCPCS

## 2025-02-18 PROCEDURE — 76642 ULTRASOUND BREAST LIMITED: CPT

## 2025-02-19 ENCOUNTER — TELEPHONE (OUTPATIENT)
Age: 65
End: 2025-02-19

## 2025-02-19 NOTE — TELEPHONE ENCOUNTER
Patient called in stating she needs ENT faxed over to Dr. Alonzo's office at [phone]. Please advise once confirmation is received. Thank you.

## 2025-04-23 ENCOUNTER — OFFICE VISIT (OUTPATIENT)
Dept: URGENT CARE | Age: 65
End: 2025-04-23
Payer: MEDICARE

## 2025-04-23 VITALS
SYSTOLIC BLOOD PRESSURE: 153 MMHG | RESPIRATION RATE: 16 BRPM | HEART RATE: 68 BPM | TEMPERATURE: 99.2 F | OXYGEN SATURATION: 98 % | DIASTOLIC BLOOD PRESSURE: 87 MMHG

## 2025-04-23 DIAGNOSIS — M54.2 NECK PAIN: Primary | ICD-10-CM

## 2025-04-23 PROCEDURE — G0463 HOSPITAL OUTPT CLINIC VISIT: HCPCS

## 2025-04-23 PROCEDURE — 99203 OFFICE O/P NEW LOW 30 MIN: CPT

## 2025-04-23 RX ORDER — METHOCARBAMOL 500 MG/1
500 TABLET, FILM COATED ORAL 3 TIMES DAILY PRN
Qty: 21 TABLET | Refills: 0 | Status: SHIPPED | OUTPATIENT
Start: 2025-04-23 | End: 2025-04-30

## 2025-04-23 NOTE — PATIENT INSTRUCTIONS
Suspect acute muscle spasm of neck, please begin Robaxin as directed. Do not drive/operate machinery within 8 hours of taking due to potential for sedation.   X-rays offered, declined at this time. Low suspicion for bony abnormality based on exam.   May continue Tylenol, Advil, heat/ice as needed.  Follow up with PCP if no relief within one week.   Go to ED for worsening symptoms.

## 2025-04-23 NOTE — PROGRESS NOTES
[facility]  Name: Suly Krause      : 1960      MRN: [id number]  Encounter Provider: MELISSA Valencia  Encounter Date: 2025   Encounter department: [facility] [place]  :  Suspect acute muscle spasm of neck, please begin Robaxin as directed. Do not drive/operate machinery within 8 hours of taking due to potential for sedation.   X-rays offered, declined at this time. Low suspicion for bony abnormality based on exam.   May continue Tylenol, Advil, heat/ice as needed.  Follow up with PCP if no relief within one week.   Go to ED for worsening symptoms.   Assessment & Plan  Neck pain    Orders:    methocarbamol (ROBAXIN) 500 mg tablet; Take 1 tablet (500 mg total) by mouth 3 (three) times a day as needed for muscle spasms for up to 7 days        Patient Instructions  Patient Education     Neck Pain ED   General Information   You came to the Emergency Department (ED) for neck pain. Your neck has many parts including bones, muscles, tendons, ligaments, and nerves. Vertebrae, the bones in your spine, start at the base of your skull and extend down the back of your neck. There are discs between the vertebrae to cushion the bones. Ligaments, muscles, and tendons help hold your spine in place and let you move your neck. Your spinal cord starts at the base of your brain and extends down your back. It is protected by your vertebrae. Smaller nerves travel from your spinal cord to your muscles and skin.  Most neck pain is caused by an injury to a ligament, tendon, muscle, or nerve. The doctors who examined you today do not think you have a dangerous cause for your neck pain.  What care is needed at home?   Call your regular doctor to let them know you were in the ED. Make a follow-up appointment if you were told to.  Wear your neck brace or cushion as you were told to. If the doctor told you to, you may start doing gentle neck stretches in a few days.  For recent strains, place an ice pack or a  bag of frozen vegetables wrapped in a towel over the painful part. Never put ice right on the skin. Use ice every 1 to 2 hours for 10 to 15 minutes at a time. Use for the first 24 to 48 hours after your injury.  Use heat after the first 24 to 48 hours, but not right away. Put a heating pad on the painful part for no more than 20 minutes at a time. Never go to sleep with a heating pad on as this can cause burns. You can also take a hot shower or bath.  You may want to take medicines like ibuprofen or naproxen for swelling and pain. These are nonsteroidal anti-inflammatory drugs (NSAIDs).  Try to practice good posture to avoid putting strain on your neck. Sit up straight and keep your shoulders back. It can also help to avoid sitting in the same position for too long and to avoid putting pressure on your upper back by carrying heavy things. When you sleep, try to keep your neck in line with the rest of your body.  When do I need to get emergency help?   Call for an ambulance right away if:   Your neck becomes stiff and hard to move and you are feeling sick or develop a fever or chills.  Return to the ED if:   You have new weakness in one of your arms.  When do I need to call the doctor?   You have bad pain that is not helped by pain medicine.  Your hand or arm is swollen.  Your arm is numb or tingly.  You have new or worsening symptoms.  Last Reviewed Date   2021-03-09  Consumer Information Use and Disclaimer   This generalized information is a limited summary of diagnosis, treatment, and/or medication information. It is not meant to be comprehensive and should be used as a tool to help the user understand and/or assess potential diagnostic and treatment options. It does NOT include all information about conditions, treatments, medications, side effects, or risks that may apply to a specific patient. It is not intended to be medical advice or a substitute for the medical advice, diagnosis, or treatment of a health  care provider based on the health care provider's examination and assessment of a patient’s specific and unique circumstances. Patients must speak with a health care provider for complete information about their health, medical questions, and treatment options, including any risks or benefits regarding use of medications. This information does not endorse any treatments or medications as safe, effective, or approved for treating a specific patient. [employer] and its affiliates disclaim any warranty or liability relating to this information or the use thereof. The use of this information is governed by the Terms of Use, available at https://www.[employer]/en/know/clinical-effectiveness-terms   Copyright   Follow up with PCP in 3-5 days.  Proceed to  ER if symptoms worsen.    If tests are performed, our office will contact you with results only if changes need to made to the care plan discussed with you at the visit. You can review your full results on [facility] MyChart.    Chief Complaint:   Chief Complaint   Patient presents with    Neck Pain     Started this morning. Limited movement in neck. Painful to move, stiffness. Has tried using a heating pad and ice pack, not effective.      History of Present Illness   Neck Pain   This is a new problem. The current episode started today. The problem occurs constantly. The problem has been gradually worsening. The pain is associated with an unknown factor. The pain is present in the right side and left side. The quality of the pain is described as aching and cramping. The symptoms are aggravated by twisting. The pain is Same all the time. Pertinent negatives include no chest pain, fever, headaches, leg pain, numbness, pain with swallowing, paresis, photophobia, syncope, tingling, trouble swallowing, visual change, weakness or weight loss. She has tried acetaminophen for the symptoms. The treatment provided no relief.         Review of Systems   Constitutional:   Negative for fatigue, fever and weight loss.   HENT:  Negative for congestion, ear discharge, ear pain, postnasal drip, rhinorrhea, sinus pressure, sinus pain, sneezing, sore throat and trouble swallowing.    Eyes: Negative.  Negative for photophobia, pain, discharge, redness and itching.   Respiratory: Negative.  Negative for apnea, cough, choking, chest tightness, shortness of breath, wheezing and stridor.    Cardiovascular: Negative.  Negative for chest pain, palpitations and syncope.   Gastrointestinal: Negative.  Negative for diarrhea, nausea and vomiting.   Endocrine: Negative.  Negative for polydipsia, polyphagia and polyuria.   Genitourinary: Negative.  Negative for decreased urine volume and flank pain.   Musculoskeletal:  Positive for neck pain. Negative for arthralgias, back pain, gait problem, joint swelling, myalgias and neck stiffness.   Skin: Negative.  Negative for color change and rash.   Allergic/Immunologic: Negative.  Negative for environmental allergies.   Neurological: Negative.  Negative for dizziness, tingling, facial asymmetry, weakness, light-headedness, numbness and headaches.   Hematological: Negative.  Negative for adenopathy.   Psychiatric/Behavioral: Negative.       Past Medical History   Past Medical History:   Diagnosis Date    Disease of thyroid gland     Diverticulitis of colon     GERD (gastroesophageal reflux disease)     High cholesterol     Hypertension     Stroke (HCC)      Past Surgical History:   Procedure Laterality Date    CARPAL TUNNEL RELEASE       SECTION      CHOLECYSTECTOMY      COLONOSCOPY       Family History   Problem Relation Age of Onset    COPD Mother     Heart disease Father     Heart attack Father     Crohn's disease Sister     No Known Problems Sister     No Known Problems Sister     No Known Problems Daughter     No Known Problems Daughter     Colon cancer Maternal Grandmother     No Known Problems Maternal Grandfather     Alzheimer's disease  Paternal Grandmother     Heart disease Paternal Grandfather     No Known Problems Son     No Known Problems Son     No Known Problems Other     Breast cancer Neg Hx      she reports that she has been smoking cigarettes. She started smoking about 46 years ago. She has a 11.7 pack-year smoking history. She has never used smokeless tobacco. She reports current alcohol use of about 1.0 standard drink of alcohol per week. She reports that she does not use drugs.  Current Outpatient Medications   Medication Instructions    aspirin 81 mg, Daily    atorvastatin (LIPITOR) 20 mg, Daily    folic acid (FOLVITE) 1 mg, Daily    levothyroxine 88 mcg, Daily    lisinopril (ZESTRIL) 20 mg, Daily    methocarbamol (ROBAXIN) 500 mg, Oral, 3 times daily PRN    methotrexate 2.5 MG tablet TAKE 8 TABLETS BY MOUTH ONCE A WEEK TAKE 4 TABLETS IN THE MORNING AND 4 TABLETS IN THE EVENING.    polyethylene glycol (GLYCOLAX) 17 g, Oral, 2 times daily     Allergies   Allergen Reactions    Tomato - Food Allergy Hives, Swelling, Itching, Lip Swelling, Rash and Tongue Swelling     Tongue swelling    Apple - Food Allergy Itching    Latex Rash, Hives and Itching    Tomato (Diagnostic) - Food Allergy Blisters    Bee Pollen Other (See Comments)    Pollen Extract Cough, Headache, Itching, Nasal Congestion and Sneezing        Objective   /87   Pulse 68   Temp 99.2 °F (37.3 °C) (Tympanic)   Resp 16   SpO2 98%      Physical Exam  Vitals and nursing note reviewed.   Constitutional:       General: She is not in acute distress.     Appearance: She is well-developed. She is not ill-appearing, toxic-appearing or diaphoretic.      Interventions: She is not intubated.  HENT:      Head: Normocephalic and atraumatic.      Right Ear: External ear normal.      Left Ear: External ear normal.   Eyes:      Conjunctiva/sclera: Conjunctivae normal.   Neck:        Comments: (+) Bilateral neck tightness/TTP at base of neck and occipital region.   No spinous process  "TTP.   Cardiovascular:      Rate and Rhythm: Normal rate and regular rhythm.      Pulses: Normal pulses.      Heart sounds: Normal heart sounds, S1 normal and S2 normal. Heart sounds not distant. No murmur heard.     No friction rub. No gallop.   Pulmonary:      Effort: Pulmonary effort is normal. No tachypnea, bradypnea, accessory muscle usage, prolonged expiration, respiratory distress or retractions. She is not intubated.      Breath sounds: No stridor, decreased air movement or transmitted upper airway sounds. No decreased breath sounds, wheezing, rhonchi or rales.   Abdominal:      Palpations: Abdomen is soft.      Tenderness: There is no abdominal tenderness.   Musculoskeletal:         General: No swelling.      Cervical back: Neck supple. No rigidity. Muscular tenderness present. No spinous process tenderness. Decreased range of motion.   Lymphadenopathy:      Cervical: No cervical adenopathy.      Right cervical: No superficial cervical adenopathy.     Left cervical: No superficial cervical adenopathy.   Skin:     General: Skin is warm and dry.      Capillary Refill: Capillary refill takes less than 2 seconds.   Neurological:      Mental Status: She is alert.   Psychiatric:         Mood and Affect: Mood normal.         Portions of the record may have been created with voice recognition software.  Occasional wrong word or \"sound a like\" substitutions may have occurred due to the inherent limitations of voice recognition software.  Read the chart carefully and recognize, using context, where substitutions have occurred.  "

## 2025-04-26 ENCOUNTER — OFFICE VISIT (OUTPATIENT)
Dept: URGENT CARE | Facility: MEDICAL CENTER | Age: 65
End: 2025-04-26
Payer: MEDICARE

## 2025-04-26 VITALS
WEIGHT: 136 LBS | OXYGEN SATURATION: 99 % | HEIGHT: 63 IN | TEMPERATURE: 98.9 F | BODY MASS INDEX: 24.1 KG/M2 | SYSTOLIC BLOOD PRESSURE: 138 MMHG | HEART RATE: 80 BPM | RESPIRATION RATE: 20 BRPM | DIASTOLIC BLOOD PRESSURE: 88 MMHG

## 2025-04-26 DIAGNOSIS — S16.1XXA CERVICAL STRAIN, ACUTE, INITIAL ENCOUNTER: Primary | ICD-10-CM

## 2025-04-26 PROCEDURE — 99213 OFFICE O/P EST LOW 20 MIN: CPT | Performed by: NURSE PRACTITIONER

## 2025-04-26 PROCEDURE — G0463 HOSPITAL OUTPT CLINIC VISIT: HCPCS | Performed by: NURSE PRACTITIONER

## 2025-04-26 RX ORDER — METHYLPREDNISOLONE 4 MG/1
TABLET ORAL
Qty: 1 EACH | Refills: 0 | Status: SHIPPED | OUTPATIENT
Start: 2025-04-26

## 2025-04-26 NOTE — PATIENT INSTRUCTIONS
Suspect cervical spine inflammatory process with muscle spasms  Medrol Nayan as discussed   Continue robaxin given effectiveness, may increase to q6hrs PRN if no drowsiness occurs   PRN tylenol 1000 mg q8hrs for pain   Physical therapy as able   PRN ice/heat to affected area   Mild stretches daily   Avoid sleeping on elevated pillow   F/u with PCP for further evaluation within 1-2 weeks  Proceed to the ER should symptoms worsen

## 2025-04-26 NOTE — PROGRESS NOTES
[facility]  Name: Suly Krause      : 1960      MRN: [id number]  Encounter Provider: MELISSA Singleton  Encounter Date: 2025   Encounter department: [facility]  :  Assessment & Plan  Cervical strain, acute, initial encounter    Orders:    methylPREDNISolone 4 MG tablet therapy pack; Use as directed on package    Ambulatory Referral to Physical Therapy; Future      Patient Instructions  Suspect cervical spine inflammatory process with muscle spasms  Medrol Nayan as discussed   Continue robaxin given effectiveness, may increase to q6hrs PRN if no drowsiness occurs   PRN tylenol 1000 mg q8hrs for pain   Physical therapy as able   PRN ice/heat to affected area   Mild stretches daily   Avoid sleeping on elevated pillow   F/u with PCP for further evaluation within 1-2 weeks  Proceed to the ER should symptoms worsen     If tests are performed, our office will contact you with results only if changes need to made to the care plan discussed with you at the visit. You can review your full results on [facility].    Chief Complaint:   Chief Complaint   Patient presents with    Neck Pain     Patient states 5 days ago she woke up with neck pain; hx of muscle spasms but in lower back; has been taking robaxin and aleve with little relief; was evaluated at another urgent care a few days ago but states she cannot move her neck at all and it is frozen which prompted her to be evaluated today     Earache     Left sided ear pain      History of Present Illness   64 y/o female presents for cervical spine pain that is initially relived by robaxin but returns within 2 hrs. Patient reports tension along the neck line traveling down her trapezius more on the right side. She has hx of RA and reports taking methotrexate. She denies any hx of cervical spine issues, trauma or injury.           Review of Systems   Musculoskeletal:  Positive for arthralgias, myalgias and neck pain.   All other  systems reviewed and are negative.    Past Medical History   Past Medical History:   Diagnosis Date    Disease of thyroid gland     Diverticulitis of colon     GERD (gastroesophageal reflux disease)     High cholesterol     Hypertension     Stroke (HCC)      Past Surgical History:   Procedure Laterality Date    CARPAL TUNNEL RELEASE       SECTION      CHOLECYSTECTOMY      COLONOSCOPY       Family History   Problem Relation Age of Onset    COPD Mother     Heart disease Father     Heart attack Father     Crohn's disease Sister     No Known Problems Sister     No Known Problems Sister     No Known Problems Daughter     No Known Problems Daughter     Colon cancer Maternal Grandmother     No Known Problems Maternal Grandfather     Alzheimer's disease Paternal Grandmother     Heart disease Paternal Grandfather     No Known Problems Son     No Known Problems Son     No Known Problems Other     Breast cancer Neg Hx      she reports that she has been smoking cigarettes. She started smoking about 46 years ago. She has a 11.7 pack-year smoking history. She has never used smokeless tobacco. She reports current alcohol use of about 1.0 standard drink of alcohol per week. She reports that she does not use drugs.  Current Outpatient Medications   Medication Instructions    aspirin 81 mg, Daily    atorvastatin (LIPITOR) 20 mg, Daily    folic acid (FOLVITE) 1 mg, Daily    levothyroxine 88 mcg, Daily    lisinopril (ZESTRIL) 20 mg, Daily    methocarbamol (ROBAXIN) 500 mg, Oral, 3 times daily PRN    methotrexate 2.5 MG tablet TAKE 8 TABLETS BY MOUTH ONCE A WEEK TAKE 4 TABLETS IN THE MORNING AND 4 TABLETS IN THE EVENING.    methylPREDNISolone 4 MG tablet therapy pack Use as directed on package    polyethylene glycol (GLYCOLAX) 17 g, Oral, 2 times daily     Allergies   Allergen Reactions    Tomato - Food Allergy Hives, Swelling, Itching, Lip Swelling, Rash and Tongue Swelling     Tongue swelling    Apple - Food Allergy Itching     "Latex Rash, Hives and Itching    Tomato (Diagnostic) - Food Allergy Blisters    Bee Pollen Other (See Comments)    Pollen Extract Cough, Headache, Itching, Nasal Congestion and Sneezing        Objective   /88   Pulse 80   Temp 98.9 °F (37.2 °C) (Temporal)   Resp 20   Ht 5' 3\" (1.6 m)   Wt 61.7 kg (136 lb)   SpO2 99%   BMI 24.09 kg/m²      Physical Exam  Constitutional:       Appearance: Normal appearance.   HENT:      Head: Normocephalic and atraumatic.      Right Ear: External ear normal.      Left Ear: External ear normal.      Nose: Nose normal.      Mouth/Throat:      Mouth: Mucous membranes are moist.      Pharynx: Oropharynx is clear.   Eyes:      Conjunctiva/sclera: Conjunctivae normal.   Cardiovascular:      Rate and Rhythm: Normal rate and regular rhythm.   Pulmonary:      Effort: Pulmonary effort is normal.   Abdominal:      Palpations: Abdomen is soft.   Musculoskeletal:      Cervical back: Spasms and tenderness present. No swelling, edema, deformity, erythema, signs of trauma, rigidity, bony tenderness or crepitus. Pain with movement present. Decreased range of motion.        Back:    Skin:     General: Skin is warm and dry.      Capillary Refill: Capillary refill takes less than 2 seconds.   Neurological:      General: No focal deficit present.      Mental Status: She is alert and oriented to person, place, and time.   Psychiatric:         Behavior: Behavior normal.         Thought Content: Thought content normal.         Portions of the record may have been created with voice recognition software.  Occasional wrong word or \"sound a like\" substitutions may have occurred due to the inherent limitations of voice recognition software.  Read the chart carefully and recognize, using context, where substitutions have occurred.  "